# Patient Record
Sex: MALE | Race: WHITE | Employment: OTHER | URBAN - METROPOLITAN AREA
[De-identification: names, ages, dates, MRNs, and addresses within clinical notes are randomized per-mention and may not be internally consistent; named-entity substitution may affect disease eponyms.]

---

## 2017-03-09 ENCOUNTER — OFFICE VISIT (OUTPATIENT)
Dept: DERMATOLOGY CLINIC | Facility: CLINIC | Age: 67
End: 2017-03-09

## 2017-03-09 DIAGNOSIS — D22.9 MULTIPLE NEVI: ICD-10-CM

## 2017-03-09 DIAGNOSIS — L40.8 OTHER PSORIASIS: ICD-10-CM

## 2017-03-09 DIAGNOSIS — L57.0 AK (ACTINIC KERATOSIS): Primary | ICD-10-CM

## 2017-03-09 DIAGNOSIS — L82.0 INFLAMED SEBORRHEIC KERATOSIS: ICD-10-CM

## 2017-03-09 DIAGNOSIS — Z85.828 HISTORY OF SKIN CANCER: ICD-10-CM

## 2017-03-09 DIAGNOSIS — D23.9 BENIGN NEOPLASM OF SKIN, UNSPECIFIED LOCATION: ICD-10-CM

## 2017-03-09 PROCEDURE — 17000 DESTRUCT PREMALG LESION: CPT | Performed by: DERMATOLOGY

## 2017-03-09 PROCEDURE — 99213 OFFICE O/P EST LOW 20 MIN: CPT | Performed by: DERMATOLOGY

## 2017-03-27 NOTE — H&P
Catrina Bowens is a 61year old male. HPI:     CC:  Patient presents with:  Derm Problem: Established pt (LOV 4/2015) c/o lesions on face that has different texture. 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   Psoriasis: Hx of psoriasis to legs.  Managing with  Fluocinoni Problem: Established pt (LOV 4/2015) c/o lesions on face that has different texture. 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   Psoriasis: Hx of psoriasis to legs. Managing with  Fluocinonide (LIDEX) 0.05 % cream. Good results. Requesting refills.        Patient presents with c twice daily. Ak (actinic keratosis)  (primary encounter diagnosis)  History of skin cancer  Inflamed seborrheic keratosis  Other psoriasis  Multiple nevi  Benign neoplasm of skin, unspecified location    See details on map.       Remarkable for: melanoma discussed with patient. Sunscreen use, sun protection, self exams reviewed. Followup as noted RTC routine checkup 6 mos - one year or p.r.n.

## 2018-03-14 ENCOUNTER — OFFICE VISIT (OUTPATIENT)
Dept: DERMATOLOGY CLINIC | Facility: CLINIC | Age: 68
End: 2018-03-14

## 2018-03-14 DIAGNOSIS — L40.8 OTHER PSORIASIS: ICD-10-CM

## 2018-03-14 DIAGNOSIS — D48.5 NEOPLASM OF UNCERTAIN BEHAVIOR OF SKIN: Primary | ICD-10-CM

## 2018-03-14 DIAGNOSIS — Z85.828 HISTORY OF SKIN CANCER: ICD-10-CM

## 2018-03-14 DIAGNOSIS — D23.9 BENIGN NEOPLASM OF SKIN, UNSPECIFIED LOCATION: ICD-10-CM

## 2018-03-14 DIAGNOSIS — L57.0 AK (ACTINIC KERATOSIS): ICD-10-CM

## 2018-03-14 DIAGNOSIS — D22.9 MULTIPLE NEVI: ICD-10-CM

## 2018-03-14 DIAGNOSIS — L82.0 INFLAMED SEBORRHEIC KERATOSIS: ICD-10-CM

## 2018-03-14 PROCEDURE — 88305 TISSUE EXAM BY PATHOLOGIST: CPT | Performed by: DERMATOLOGY

## 2018-03-14 PROCEDURE — 11100 BIOPSY OF SKIN LESION: CPT | Performed by: DERMATOLOGY

## 2018-03-14 PROCEDURE — 99213 OFFICE O/P EST LOW 20 MIN: CPT | Performed by: DERMATOLOGY

## 2018-03-14 RX ORDER — AMLODIPINE BESYLATE 5 MG/1
5 TABLET ORAL DAILY
COMMUNITY

## 2018-03-15 NOTE — PROGRESS NOTES
Operative Report                     Shave Biopsy     Clinical diagnosis:    Size of lesion:    Location:    Diagnosis/Clinical History: bleeding painful lesion  Spec 1 Description >>>>>: right lateral jawline  Spec 1 Comment: irritated IDN vs other

## 2018-03-16 ENCOUNTER — TELEPHONE (OUTPATIENT)
Dept: DERMATOLOGY CLINIC | Facility: CLINIC | Age: 68
End: 2018-03-16

## 2018-03-16 NOTE — TELEPHONE ENCOUNTER
Pt called to find out if we can resend rx due to the [facility] stating they never received it. Eloisa Mckenzie

## 2018-03-17 NOTE — PROGRESS NOTES
The pathology report from last visit showed inflamed sk    . Please log in test results, send biopsy results letter. Pt to rtc 1 year or prn.

## 2018-03-17 NOTE — PROGRESS NOTES
Path as noted logged in book and [facility]

## 2018-03-26 NOTE — PROGRESS NOTES
Macky Dubin is a 74year old male. HPI:     CC:  Patient presents with:  Lesion: LOV 3-9-17.  c/o growth at R jawline, scabbed from shaving only. Also similar growth at L neck, no pain. Psoriasis: Annual visit for psosriasis.   Flaring more xander anesthetic No     Social History Narrative   None on file     No family history on file. There were no vitals filed for this visit. HPI:    Patient presents with:  Lesion: LOV 3-9-17.  c/o growth at R jawline, scabbed from shaving only.   Also similar for this Visit:    No prescriptions requested or ordered in this encounter         Neoplasm of uncertain behavior of skin  (primary encounter diagnosis)  Ak (actinic keratosis)  History of skin cancer  Inflamed seborrheic keratosis  Other psoriasis  Multip

## 2018-07-05 ENCOUNTER — OFFICE VISIT (OUTPATIENT)
Dept: DERMATOLOGY CLINIC | Facility: CLINIC | Age: 68
End: 2018-07-05

## 2018-07-05 DIAGNOSIS — L57.0 AK (ACTINIC KERATOSIS): Primary | ICD-10-CM

## 2018-07-05 DIAGNOSIS — L71.9 ROSACEA: ICD-10-CM

## 2018-07-05 DIAGNOSIS — D23.9 BENIGN NEOPLASM OF SKIN, UNSPECIFIED LOCATION: ICD-10-CM

## 2018-07-05 DIAGNOSIS — L82.0 INFLAMED SEBORRHEIC KERATOSIS: ICD-10-CM

## 2018-07-05 DIAGNOSIS — Z85.828 HISTORY OF SKIN CANCER: ICD-10-CM

## 2018-07-05 DIAGNOSIS — D22.9 MULTIPLE NEVI: ICD-10-CM

## 2018-07-05 DIAGNOSIS — L40.8 OTHER PSORIASIS: ICD-10-CM

## 2018-07-05 PROCEDURE — G0463 HOSPITAL OUTPT CLINIC VISIT: HCPCS | Performed by: DERMATOLOGY

## 2018-07-05 PROCEDURE — 99213 OFFICE O/P EST LOW 20 MIN: CPT | Performed by: DERMATOLOGY

## 2018-07-05 RX ORDER — KETOCONAZOLE 20 MG/G
CREAM TOPICAL
Qty: 60 G | Refills: 3 | Status: SHIPPED | OUTPATIENT
Start: 2018-07-05 | End: 2020-10-07 | Stop reason: ALTCHOICE

## 2018-07-16 NOTE — PROGRESS NOTES
Adi Rubi is a 79year old male. HPI:     CC:  Patient presents with:  Upper Body Exam: LOV 3/2018. Pt requesting upper body exam. Concerned with multiple lesions on back . Pt thought it was psoriasis at first. Hx of SCC and Aks.          Allerg anesthetic No     Social History Narrative   None on file     No family history on file. There were no vitals filed for this visit. HPI:    Patient presents with:  Upper Body Exam: LOV 3/2018.  Pt requesting upper body exam. Concerned with multiple le to affected area 2 times daily.              Ak (actinic keratosis)  (primary encounter diagnosis)  History of skin cancer  Rosacea  Other psoriasis  Inflamed seborrheic keratosis  Multiple nevi  Benign neoplasm of skin, unspecified location      Actinic ke grid.  Instructions reviewed at length. Benign nevi, seborrheic  keratoses, cherry angiomas:  Reassurance regarding other benign skin lesions. Signs and symptoms of skin cancer, ABCDE's of melanoma discussed with patient.  Sunscreen use, sun protection, s

## 2019-05-09 ENCOUNTER — OFFICE VISIT (OUTPATIENT)
Dept: DERMATOLOGY CLINIC | Facility: CLINIC | Age: 69
End: 2019-05-09
Payer: MEDICARE

## 2019-05-09 DIAGNOSIS — D23.5 BENIGN NEOPLASM OF SKIN OF TRUNK, EXCEPT SCROTUM: ICD-10-CM

## 2019-05-09 DIAGNOSIS — D23.30 BENIGN NEOPLASM OF SKIN OF FACE: ICD-10-CM

## 2019-05-09 DIAGNOSIS — D23.4 BENIGN NEOPLASM OF SCALP AND SKIN OF NECK: ICD-10-CM

## 2019-05-09 DIAGNOSIS — D22.9 MULTIPLE NEVI: ICD-10-CM

## 2019-05-09 DIAGNOSIS — L71.9 ROSACEA: ICD-10-CM

## 2019-05-09 DIAGNOSIS — L57.0 AK (ACTINIC KERATOSIS): Primary | ICD-10-CM

## 2019-05-09 DIAGNOSIS — L02.92 BOIL: ICD-10-CM

## 2019-05-09 DIAGNOSIS — D23.60 BENIGN NEOPLASM OF SKIN OF UPPER LIMB, INCLUDING SHOULDER, UNSPECIFIED LATERALITY: ICD-10-CM

## 2019-05-09 DIAGNOSIS — L40.8 OTHER PSORIASIS: ICD-10-CM

## 2019-05-09 DIAGNOSIS — D23.70 BENIGN NEOPLASM OF SKIN OF LOWER LIMB, INCLUDING HIP, UNSPECIFIED LATERALITY: ICD-10-CM

## 2019-05-09 DIAGNOSIS — L82.0 INFLAMED SEBORRHEIC KERATOSIS: ICD-10-CM

## 2019-05-09 DIAGNOSIS — Z85.828 HISTORY OF SKIN CANCER: ICD-10-CM

## 2019-05-09 PROCEDURE — G0463 HOSPITAL OUTPT CLINIC VISIT: HCPCS | Performed by: DERMATOLOGY

## 2019-05-09 PROCEDURE — 99214 OFFICE O/P EST MOD 30 MIN: CPT | Performed by: DERMATOLOGY

## 2019-05-09 RX ORDER — SILODOSIN 4 MG/1
CAPSULE ORAL
Refills: 0 | COMMUNITY
Start: 2019-01-02 | End: 2020-10-07 | Stop reason: ALTCHOICE

## 2019-05-09 RX ORDER — AMOXICILLIN AND CLAVULANATE POTASSIUM 875; 125 MG/1; MG/1
TABLET, FILM COATED ORAL
Refills: 0 | COMMUNITY
Start: 2018-11-21 | End: 2020-10-07 | Stop reason: ALTCHOICE

## 2019-05-09 RX ORDER — CLOBETASOL PROPIONATE 0.46 MG/ML
1 SOLUTION TOPICAL 2 TIMES DAILY
Qty: 50 ML | Refills: 6 | Status: SHIPPED | OUTPATIENT
Start: 2019-05-09 | End: 2019-05-24

## 2019-05-09 RX ORDER — METRONIDAZOLE 7.5 MG/G
GEL TOPICAL
Qty: 60 G | Refills: 11 | Status: SHIPPED | OUTPATIENT
Start: 2019-05-09 | End: 2019-05-24

## 2019-05-09 RX ORDER — METHYLPREDNISOLONE 4 MG/1
TABLET ORAL
Refills: 0 | COMMUNITY
Start: 2018-11-30 | End: 2020-10-07 | Stop reason: ALTCHOICE

## 2019-05-09 RX ORDER — GABAPENTIN 300 MG/1
300 CAPSULE ORAL 3 TIMES DAILY
COMMUNITY
Start: 2019-04-21

## 2019-05-19 NOTE — PROGRESS NOTES
Christal Parra is a 81year old male. HPI:     CC:  Patient presents with:  Derm Problem: LOV 7/5/18. pt presenting today with upper body check. pt concened about itchy scalp. pt aslo states needs refill on Fluorouracil.  pt has personal HX of SCC an by mouth. Disp:  Rfl:      Allergies:     Sulfa Antibiotics           Comment:Other reaction(s): Rash    Past Medical History:   Diagnosis Date   • Psoriasis    • Squamous cell skin cancer 2013    L wrist     History reviewed.  No pertinent surgical history on file    History reviewed. No pertinent family history. There were no vitals filed for this visit. HPI:    Patient presents with:  Derm Problem: LOV 7/5/18. pt presenting today with upper body check.  pt concened about itchy scalp. pt aslo states ne papules scattered, cherry-red vascular papules scattered. See map today's date for lesions noted . Scattered psoriasiform patches over The arms legs back. .    Erythematous scaling keratotic papulesClustered at right cheek nasal dorsum bilateral temples No new skin cancer. Psoriasis. Flaring more generalized scalp face elbows knees arms hands. Nails worsening with onycholysis, pits. Some breakage.   May use the ketoconazole in these areas along with the topicals discussed other options as far as h patient is asked to return as noted in follow-up/ above. This note was generated using Dragon voice recognition software. Please contact me regarding any confusion resulting from errors in recognition.

## 2019-05-24 ENCOUNTER — TELEPHONE (OUTPATIENT)
Dept: DERMATOLOGY CLINIC | Facility: CLINIC | Age: 69
End: 2019-05-24

## 2019-05-24 RX ORDER — CLOBETASOL PROPIONATE 0.46 MG/ML
1 SOLUTION TOPICAL 2 TIMES DAILY
Qty: 50 ML | Refills: 6 | Status: SHIPPED | OUTPATIENT
Start: 2019-05-24 | End: 2020-10-07 | Stop reason: ALTCHOICE

## 2019-05-24 RX ORDER — METRONIDAZOLE 7.5 MG/G
GEL TOPICAL
Qty: 60 G | Refills: 11 | Status: SHIPPED | OUTPATIENT
Start: 2019-05-24 | End: 2020-10-07 | Stop reason: ALTCHOICE

## 2019-05-24 NOTE — TELEPHONE ENCOUNTER
Current Outpatient Medications:     •  Clobetasol Propionate 0.05 % External Solution, Apply 1 mL topically 2 (two) times daily. , Disp: 50 mL, Rfl: 6    •  metroNIDAZOLE 0.75 % External Gel, Use bid to face, Disp: 60 g, Rfl: 11  •  Fluocinonide 0.05 % Ex

## 2019-06-04 ENCOUNTER — TELEPHONE (OUTPATIENT)
Dept: DERMATOLOGY CLINIC | Facility: CLINIC | Age: 69
End: 2019-06-04

## 2019-06-04 NOTE — TELEPHONE ENCOUNTER
LINDA-LOV 5/9/19. Patient very concerned with changing lesion to neck x couple days. Lesion is changing colors and scabbing.  Booked appt for 5/9/19 at 415pm

## 2019-06-04 NOTE — TELEPHONE ENCOUNTER
Pt asking to be seen asap. Has a mole that does not look good. Was seen on 5/9/19 for upper body check .

## 2019-06-10 ENCOUNTER — TELEPHONE (OUTPATIENT)
Dept: FAMILY MEDICINE CLINIC | Facility: CLINIC | Age: 69
End: 2019-06-10

## 2019-06-10 NOTE — TELEPHONE ENCOUNTER
Received 90 day supply request....     FLUOCINONIDE CREAM 60GM 0.05%  QTY 90  REFILLS 4    Placed in Physician mailbox

## 2019-06-12 ENCOUNTER — OFFICE VISIT (OUTPATIENT)
Dept: DERMATOLOGY CLINIC | Facility: CLINIC | Age: 69
End: 2019-06-12
Payer: MEDICARE

## 2019-06-12 DIAGNOSIS — D48.5 NEOPLASM OF UNCERTAIN BEHAVIOR OF SKIN: Primary | ICD-10-CM

## 2019-06-12 PROCEDURE — 11102 TANGNTL BX SKIN SINGLE LES: CPT | Performed by: DERMATOLOGY

## 2019-06-12 PROCEDURE — 99212 OFFICE O/P EST SF 10 MIN: CPT | Performed by: DERMATOLOGY

## 2019-06-12 PROCEDURE — G0463 HOSPITAL OUTPT CLINIC VISIT: HCPCS | Performed by: DERMATOLOGY

## 2019-06-12 PROCEDURE — 88305 TISSUE EXAM BY PATHOLOGIST: CPT | Performed by: DERMATOLOGY

## 2019-06-18 NOTE — PROGRESS NOTES
The pathology report from last visit showed right lateral neck -Seborrheic keratosis, irritated. Please log in test results, send biopsy results letter. Pt to rtc 4-6mos or prn.

## 2019-06-24 NOTE — PROGRESS NOTES
Merlene Puri is a 81year old male. HPI:     CC:  Patient presents with:  Moles: LOV 5/9/2019 Patient present with raised scaley red mole on R side of neck . Patient c/o mole changinge size .  Patient has hx of SCC AK        Allergies:  Sulfa Antib Sulfa Antibiotics           Comment:Other reaction(s): Rash    Past Medical History:   Diagnosis Date   • Psoriasis    • Squamous cell skin cancer 2013    L wrist     History reviewed. No pertinent surgical history.   Social History    Socioeconomic His pertinent family history. There were no vitals filed for this visit. HPI:    Patient presents with:  Moles: LOV 5/9/2019 Patient present with raised scaley red mole on R side of neck . Patient c/o mole changinge size .  Patient has hx of SCC AK  Past over The arms legs back. .    Erythematous scaling keratotic papules right cheek nasal dorsum temples improved crusted patient using Efudex  No recurrence of ScC left-hand    Otherwise remarkable for lesions as noted on map.     Assessment / plan:    Orders Overall skincare, liberal use of emollients discussed. Consider more aggressive therapy if worsening. Patient will let us know how they are doing over the next several weeks. Await clinical response to above therapy.   Recheck in 2-3 months if no improveme

## 2019-06-24 NOTE — PROGRESS NOTES
Operative Report                     Shave/  Tangential biopsy     Clinical diagnosis:    Size of lesion:    Location:Diagnosis/Clinical History: pt with red irritated papule  Spec 1 Description >>>>>: right lateral neck  Spec 1 Comment: irritated sk

## 2019-10-26 ENCOUNTER — OFFICE VISIT (OUTPATIENT)
Dept: DERMATOLOGY CLINIC | Facility: CLINIC | Age: 69
End: 2019-10-26
Payer: MEDICARE

## 2019-10-26 DIAGNOSIS — Z85.828 HISTORY OF SKIN CANCER: ICD-10-CM

## 2019-10-26 DIAGNOSIS — L71.9 ROSACEA: ICD-10-CM

## 2019-10-26 DIAGNOSIS — S90.221S: ICD-10-CM

## 2019-10-26 DIAGNOSIS — L57.0 AK (ACTINIC KERATOSIS): Primary | ICD-10-CM

## 2019-10-26 PROCEDURE — 99213 OFFICE O/P EST LOW 20 MIN: CPT | Performed by: DERMATOLOGY

## 2019-10-26 PROCEDURE — 17000 DESTRUCT PREMALG LESION: CPT | Performed by: DERMATOLOGY

## 2019-10-26 PROCEDURE — G0463 HOSPITAL OUTPT CLINIC VISIT: HCPCS | Performed by: DERMATOLOGY

## 2019-10-26 RX ORDER — FLUOROURACIL 50 MG/G
CREAM TOPICAL
Qty: 40 G | Refills: 1 | Status: SHIPPED | OUTPATIENT
Start: 2019-10-26

## 2019-11-04 NOTE — PROGRESS NOTES
Beata Parker is a 81year old male. HPI:     CC:  Patient presents with:  Lesion: established pt, presents with new, red, scaly lesion to left temple. Pt does use fluoraracil QD-Bid prn   Toenail: please check red big toenail.  \"It has a red lesio THEREAFTER. DISCONTINUE WITH GI UPSET., Disp: , Rfl: 0  RAPAFLO 4 MG Oral Cap, TK ONE C PO D, Disp: , Rfl: 0  ketoconazole 2 % External Cream, Apply to affected area 2 times daily. , Disp: 60 g, Rfl: 3  Benazepril HCl (LOTENSIN) 20 MG Oral Tab, , Disp: , Rf Other Topics      Concerns:        Grew up on a farm: Not Asked        History of tanning: Not Asked        Outdoor occupation: Not Asked        Pt has a pacemaker: No        Pt has a defibrillator: No        Reaction to local anesthetic: No    Social Hist well-nourished patient alert oriented in no acute distress.   Exam total-body performed, including scalp, head, neck, face,nails, hair, external eyes, including conjunctival mucosa, eyelids, lips external ears, back, chest,/ breasts, axillae,  abdomen, arms lesions. Fluorouracil as needed 2 weeks on 2 weeks off to active lesions temples nose infraorbital area twice weekly 6-week course again to forehead temples. Continue topicals sun protection. On the gammaglobulin will need more careful follow-up.

## 2020-09-28 ENCOUNTER — OFFICE VISIT (OUTPATIENT)
Dept: DERMATOLOGY CLINIC | Facility: CLINIC | Age: 70
End: 2020-09-28
Payer: MEDICARE

## 2020-09-28 DIAGNOSIS — D48.5 NEOPLASM OF UNCERTAIN BEHAVIOR OF SKIN: Primary | ICD-10-CM

## 2020-09-28 DIAGNOSIS — D22.9 MULTIPLE NEVI: ICD-10-CM

## 2020-09-28 DIAGNOSIS — L40.8 OTHER PSORIASIS: ICD-10-CM

## 2020-09-28 DIAGNOSIS — Z85.828 HISTORY OF SKIN CANCER: ICD-10-CM

## 2020-09-28 DIAGNOSIS — D23.4 BENIGN NEOPLASM OF SCALP AND SKIN OF NECK: ICD-10-CM

## 2020-09-28 DIAGNOSIS — D23.30 BENIGN NEOPLASM OF SKIN OF FACE: ICD-10-CM

## 2020-09-28 DIAGNOSIS — L57.0 AK (ACTINIC KERATOSIS): ICD-10-CM

## 2020-09-28 PROCEDURE — 88305 TISSUE EXAM BY PATHOLOGIST: CPT | Performed by: DERMATOLOGY

## 2020-09-28 PROCEDURE — G0463 HOSPITAL OUTPT CLINIC VISIT: HCPCS | Performed by: DERMATOLOGY

## 2020-09-28 PROCEDURE — 11102 TANGNTL BX SKIN SINGLE LES: CPT | Performed by: DERMATOLOGY

## 2020-09-28 PROCEDURE — 99213 OFFICE O/P EST LOW 20 MIN: CPT | Performed by: DERMATOLOGY

## 2020-09-28 RX ORDER — LORATADINE 10 MG/1
10 TABLET ORAL DAILY
COMMUNITY
End: 2020-10-07 | Stop reason: ALTCHOICE

## 2020-09-28 RX ORDER — KETOCONAZOLE 20 MG/G
1 CREAM TOPICAL 2 TIMES DAILY
Qty: 30 G | Refills: 3 | Status: SHIPPED | OUTPATIENT
Start: 2020-09-28 | End: 2020-10-07 | Stop reason: ALTCHOICE

## 2020-10-01 ENCOUNTER — TELEPHONE (OUTPATIENT)
Dept: DERMATOLOGY CLINIC | Facility: CLINIC | Age: 70
End: 2020-10-01

## 2020-10-01 NOTE — TELEPHONE ENCOUNTER
Dr. Brent Packer called and stated pt's pathology resulted in Melanoma. Results have been released to [facility].

## 2020-10-02 NOTE — PROGRESS NOTES
The pathology report from last visit showed focally invasive melanoma, 0.3mm, so should need excision, not likely sentinel node. Deep margin clear. More superficial changes at lateral margin ( MIS) . M/L ptcb  About biopsy.   Would suggest Dr. Lon Rudolph

## 2020-10-03 NOTE — PROGRESS NOTES
Logged in test results book and pmh. [address] [facility] left message for pt on 10/2/2020. Waiting for pt cb.

## 2020-10-03 NOTE — PROGRESS NOTES
Pt informed of pathology results and KMT's recommendations for treatment. Detailed explanation of procedure provided to pt. Pt verbalized understanding.   Contact information for recommended doctors from [facility] provided to pt via [employer] message (per pt's re

## 2020-10-05 NOTE — PROGRESS NOTES
Operative Report                     Shave/  Tangential biopsy     Clinical diagnosis:    Size of lesion:    Location:Diagnosis/Clinical History: pt with hx skin cancer and ak's changing pigmented lesion  Spec 1 Description >>>>>: left lateral elbow

## 2020-10-05 NOTE — PROGRESS NOTES
Garett Salinasr is a 78year old male. HPI:     CC:  Patient presents with:  Lesion: LOV 10/26/19. pt presenting today with lesion to L arm for 2 years. Lesion has changed in size. Denies pain or itching.  pt has HX of AK's        Allergies:  Sulfa An 875-125 MG Oral Tab   0   • methylPREDNISolone 4 MG Oral Tablet Therapy Pack TK UTD  0   • Oxaprozin 600 MG Oral Tab TK 3 TS PO PC TODAY FOLLOWED BY 2 TS D THEREAFTER.  DISCONTINUE WITH GI UPSET.  0   • Fluocinonide 0.05 % External Cream Apply topically to of club or organization: Not on file        Attends meetings of clubs or organizations: Not on file        Relationship status: Not on file      Intimate partner violence        Fear of current or ex partner: Not on file        Emotionally abused: Not on f comprehensive 10 point review of systems was completed. Pertinent positives and negatives noted in the the HPI. History, medications, allergies reviewed as noted.        Physical Examination:     Well-developed well-nourished patient alert oriented i 0.6cmx 1.4 cm tan pink and darker brown patch  Shave/ tangential biopsy performed, operative note and consent in chart further plans pending pathology    Overall AK's fairly stable. Continue careful sun protection.   Consider repeat Efudex forehead nose ea Sunscreen use, sun protection, self exams reviewed. Followup as noted RTC routine checkup 6 mos - one year or p.r.n. RTC 4-6 months  The patient indicates understanding of these issues and agrees to the plan.   The patient is asked to return as noted in

## 2020-10-06 ENCOUNTER — TELEPHONE (OUTPATIENT)
Dept: SURGERY | Facility: CLINIC | Age: 70
End: 2020-10-06

## 2020-10-07 ENCOUNTER — OFFICE VISIT (OUTPATIENT)
Dept: SURGERY | Facility: CLINIC | Age: 70
End: 2020-10-07
Payer: MEDICARE

## 2020-10-07 VITALS
OXYGEN SATURATION: 97 % | BODY MASS INDEX: 29.59 KG/M2 | DIASTOLIC BLOOD PRESSURE: 78 MMHG | HEIGHT: 69 IN | SYSTOLIC BLOOD PRESSURE: 126 MMHG | WEIGHT: 199.81 LBS | HEART RATE: 59 BPM | RESPIRATION RATE: 16 BRPM

## 2020-10-07 DIAGNOSIS — C43.62 MALIGNANT MELANOMA OF LEFT UPPER EXTREMITY INCLUDING SHOULDER (HCC): Primary | ICD-10-CM

## 2020-10-07 PROCEDURE — 99205 OFFICE O/P NEW HI 60 MIN: CPT | Performed by: SURGERY

## 2020-10-07 NOTE — PATIENT INSTRUCTIONS
Surgery: wide local excision    Date of Surgery:  10/26/2020    Hosptial:    [address] [facility]   Phone: [phone]    · This is an inpatient procedure.   · Use the provided Chlorhexadine surgical soap(instruct

## 2020-10-12 NOTE — CONSULTS
[facility]    Patient Name:  Talia Guerrero.    YOB: 1950   Gender:  Male   Appt Date:  10/7/2020   Provider:  Lazaro Cherry MD   Insurance:  MEDICARE PART B ONLY     PATIENT PROVIDERS  Referrin •  Cholecalciferol 25 MCG (1000 UT) Oral Tab, Take 2,000 Units by mouth daily. , Disp: , Rfl:   •  triamcinolone acetonide 0.1 % External Cream, USE TWICE A DAY AS DIRECTED, Disp: 454 g, Rfl: 1  •  fluorouracil 5 % External Cream, Use twice weekly at night Endocrine: Negative for polydipsia and polyuria. Genitourinary: Negative for dysuria and difficulty urinating. Musculoskeletal: Negative for myalgias. Skin: Negative for color change and pallor.    Allergic/Immunologic: Negative for immunocompromised Electronically signed by Leroy Thomas MD on 10/1/2020 at  8:03 AM      Final Diagnosis Comment     Sections show an atypical junctional melanocytic proliferation formed of melanocytes in nests with irregular size and distribution as well as areas of singl Clinical Information     S45.3 Neoplasm Of Uncertain Behavior Of Skin.      Gross Description     Labeled with the patient's name and medical record number, left lateral elbow, received in formalin: the specimen consists of a 1.0 x 0.8 x 0.1 cm light tan sh

## 2020-10-12 NOTE — H&P (VIEW-ONLY)
[facility]    Patient Name:  Lane Kaye.    YOB: 1950   Gender:  Male   Appt Date:  10/7/2020   Provider:  Esther Cali MD   Insurance:  MEDICARE PART B ONLY     PATIENT PROVIDERS  Referrin •  Cholecalciferol 25 MCG (1000 UT) Oral Tab, Take 2,000 Units by mouth daily. , Disp: , Rfl:   •  triamcinolone acetonide 0.1 % External Cream, USE TWICE A DAY AS DIRECTED, Disp: 454 g, Rfl: 1  •  fluorouracil 5 % External Cream, Use twice weekly at night Endocrine: Negative for polydipsia and polyuria. Genitourinary: Negative for dysuria and difficulty urinating. Musculoskeletal: Negative for myalgias. Skin: Negative for color change and pallor.    Allergic/Immunologic: Negative for immunocompromised Electronically signed by Todd Wolf MD on 10/1/2020 at  8:03 AM      Final Diagnosis Comment     Sections show an atypical junctional melanocytic proliferation formed of melanocytes in nests with irregular size and distribution as well as areas of singl Clinical Information     R89.0 Neoplasm Of Uncertain Behavior Of Skin.      Gross Description     Labeled with the patient's name and medical record number, left lateral elbow, received in formalin: the specimen consists of a 1.0 x 0.8 x 0.1 cm light tan sh

## 2020-10-23 ENCOUNTER — APPOINTMENT (OUTPATIENT)
Dept: LAB | Facility: HOSPITAL | Age: 70
End: 2020-10-23
Attending: SURGERY
Payer: MEDICARE

## 2020-10-23 DIAGNOSIS — Z01.818 PREOP TESTING: ICD-10-CM

## 2020-10-26 ENCOUNTER — HOSPITAL ENCOUNTER (OUTPATIENT)
Facility: HOSPITAL | Age: 70
Setting detail: HOSPITAL OUTPATIENT SURGERY
Discharge: HOME | End: 2020-10-26
Attending: SURGERY | Admitting: SURGERY
Payer: MEDICARE

## 2020-10-26 VITALS
DIASTOLIC BLOOD PRESSURE: 77 MMHG | TEMPERATURE: 98 F | RESPIRATION RATE: 16 BRPM | OXYGEN SATURATION: 99 % | HEART RATE: 59 BPM | BODY MASS INDEX: 28.88 KG/M2 | SYSTOLIC BLOOD PRESSURE: 140 MMHG | HEIGHT: 69 IN | WEIGHT: 195 LBS

## 2020-10-26 DIAGNOSIS — Z01.818 PREOP TESTING: Primary | ICD-10-CM

## 2020-10-26 DIAGNOSIS — C43.62 MALIGNANT MELANOMA OF LEFT UPPER EXTREMITY INCLUDING SHOULDER (HCC): ICD-10-CM

## 2020-10-26 PROCEDURE — 88305 TISSUE EXAM BY PATHOLOGIST: CPT | Performed by: SURGERY

## 2020-10-26 PROCEDURE — 0HBEXZZ EXCISION OF LEFT LOWER ARM SKIN, EXTERNAL APPROACH: ICD-10-PCS | Performed by: SURGERY

## 2020-10-26 RX ORDER — TRAMADOL HYDROCHLORIDE 50 MG/1
TABLET ORAL EVERY 4 HOURS PRN
Qty: 15 TABLET | Refills: 0 | Status: SHIPPED | OUTPATIENT
Start: 2020-10-26 | End: 2020-11-04 | Stop reason: ALTCHOICE

## 2020-10-26 RX ORDER — LIDOCAINE HYDROCHLORIDE AND EPINEPHRINE 10; 10 MG/ML; UG/ML
INJECTION, SOLUTION INFILTRATION; PERINEURAL AS NEEDED
Status: DISCONTINUED | OUTPATIENT
Start: 2020-10-26 | End: 2020-10-26 | Stop reason: HOSPADM

## 2020-10-26 RX ORDER — BUPIVACAINE HYDROCHLORIDE 5 MG/ML
INJECTION, SOLUTION EPIDURAL; INTRACAUDAL AS NEEDED
Status: DISCONTINUED | OUTPATIENT
Start: 2020-10-26 | End: 2020-10-26 | Stop reason: HOSPADM

## 2020-10-26 NOTE — BRIEF OP NOTE
Pre-Operative Diagnosis: Malignant melanoma of left upper extremity including shoulder (HCC) [C43.62]     Post-Operative Diagnosis: Malignant melanoma of left upper extremity including shoulder ([address] [C43.62]      Procedure Performed:   Procedure(s):  wide

## 2020-10-26 NOTE — INTERVAL H&P NOTE
Patient seen and examined. No changes to the attached history and physical are noted. 78year old male presenting today for planned wide local excision.     Magalis Romero PA-C    Department of Surgical Oncology  [facility]

## 2020-10-28 NOTE — OPERATIVE REPORT
Date of operation 10/26/2020    Preoperative diagnosis:  1. History of malignant melanoma, pT1a, left arm    Operation performed:  1. Wide local excision of malignant left arm melanoma, dimensions 2.5 x 7.5 cm  2.   Complex closure of wide local excision [facility] Surgical Oncology  [place] [id number]  Pager [phone]  [email or web address]

## 2020-11-04 ENCOUNTER — OFFICE VISIT (OUTPATIENT)
Dept: SURGERY | Facility: CLINIC | Age: 70
End: 2020-11-04
Payer: MEDICARE

## 2020-11-04 VITALS
DIASTOLIC BLOOD PRESSURE: 80 MMHG | HEART RATE: 60 BPM | BODY MASS INDEX: 29 KG/M2 | OXYGEN SATURATION: 96 % | RESPIRATION RATE: 16 BRPM | SYSTOLIC BLOOD PRESSURE: 130 MMHG | WEIGHT: 196 LBS | TEMPERATURE: 99 F

## 2020-11-04 DIAGNOSIS — C43.62 MALIGNANT MELANOMA OF LEFT UPPER ARM (HCC): Primary | ICD-10-CM

## 2020-11-04 PROCEDURE — 99024 POSTOP FOLLOW-UP VISIT: CPT | Performed by: SURGERY

## 2020-11-04 NOTE — PROGRESS NOTES
[facility]    Patient Name:  Megan Rangel.    YOB: 1950   Gender:  Male   Appt Date:  11/4/2020   Provider:  Alize Ross MD   Insurance:  MEDICARE PART B ONLY     PATIENT PROVIDERS  Referrin Besylate 5 MG Oral Tab, Take 5 mg by mouth daily. , Disp: , Rfl:   •  aspirin 81 MG Oral Chew Tab, Chew  by mouth., Disp: , Rfl:      Allergies Reviewed:    Sulfa Antibiotics       RASH     History:  Reviewed:  Past Medical History:   Diagnosis Date   • Hig Soft. Bowel sounds are normal. There is no hepatosplenomegaly. There is no abdominal tenderness. Musculoskeletal: Normal range of motion. Lymphadenopathy:        Right cervical: No superficial cervical adenopathy present.        Left cervical: No superfic identified    Anatomic Elodia Newman) Level  II (melanoma present in but does not fill and expand papillary dermis)    Mitotic Rate  None identified    Microsatellite(s)  Not identified    Lymphovascular Invasion  Not identified    Neurotropism  Not identified

## 2020-11-17 RX ORDER — CLOBETASOL PROPIONATE 0.46 MG/ML
SOLUTION TOPICAL
Qty: 200 ML | Refills: 3 | Status: SHIPPED | OUTPATIENT
Start: 2020-11-17

## 2021-04-19 ENCOUNTER — OFFICE VISIT (OUTPATIENT)
Dept: DERMATOLOGY CLINIC | Facility: CLINIC | Age: 71
End: 2021-04-19
Payer: MEDICARE

## 2021-04-19 DIAGNOSIS — D23.5 BENIGN NEOPLASM OF SKIN OF TRUNK, EXCEPT SCROTUM: ICD-10-CM

## 2021-04-19 DIAGNOSIS — D22.9 MULTIPLE NEVI: ICD-10-CM

## 2021-04-19 DIAGNOSIS — L82.1 SEBORRHEIC KERATOSES: Primary | ICD-10-CM

## 2021-04-19 DIAGNOSIS — L40.8 OTHER PSORIASIS: ICD-10-CM

## 2021-04-19 DIAGNOSIS — D23.60 BENIGN NEOPLASM OF SKIN OF UPPER LIMB, INCLUDING SHOULDER, UNSPECIFIED LATERALITY: ICD-10-CM

## 2021-04-19 DIAGNOSIS — D23.70 BENIGN NEOPLASM OF SKIN OF LOWER LIMB, INCLUDING HIP, UNSPECIFIED LATERALITY: ICD-10-CM

## 2021-04-19 DIAGNOSIS — L57.0 AK (ACTINIC KERATOSIS): ICD-10-CM

## 2021-04-19 DIAGNOSIS — D23.30 BENIGN NEOPLASM OF SKIN OF FACE: ICD-10-CM

## 2021-04-19 DIAGNOSIS — Z85.828 HISTORY OF SKIN CANCER: ICD-10-CM

## 2021-04-19 DIAGNOSIS — D23.4 BENIGN NEOPLASM OF SCALP AND SKIN OF NECK: ICD-10-CM

## 2021-04-19 PROCEDURE — 99213 OFFICE O/P EST LOW 20 MIN: CPT | Performed by: DERMATOLOGY

## 2021-04-19 RX ORDER — KETOCONAZOLE 20 MG/G
CREAM TOPICAL
COMMUNITY
Start: 2020-09-28

## 2021-04-25 NOTE — PROGRESS NOTES
Ino Negrete is a 78year old male. HPI:     CC:  Patient presents with:  Upper Body Exam: LOV 9/28/20. pt presenting today with upper body skin check.  pt has HX of  AK's,SCC, Melanoma        Allergies:  Sulfa Antibiotics    HISTORY:    Past Medic cell skin cancer 2013    L wrist     Past Surgical History:   Procedure Laterality Date   • ADENOIDECTOMY     • BIOPSY      nerve biopsy of foot   • TONSILLECTOMY       Social History    Socioeconomic History      Marital status:       Spouse name: history. There were no vitals filed for this visit. HPI:    Patient presents with:  Upper Body Exam: LOV 9/28/20. pt presenting today with upper body skin check.  pt has HX of  AK's,SCC, Melanoma      Past notes/ records and appropriate/relevant lab r lesions noted . Scattered psoriasiform patches over The arms legs back. .    Erythematous scaling keratotic papules right cheek nasal dorsum temples improved crusted patient using Efudex  No recurrence of ScC left-hand    Otherwise remarkable for lesions a More keratotic papule over the nasal dorsum trial of cryo. Await response. Recheck 4 months    Please refer to map for specific lesions. See additional diagnoses. Pros cons of various therapies, risks benefits discussed. Pathophysiology discussed with p

## 2021-05-05 ENCOUNTER — OFFICE VISIT (OUTPATIENT)
Dept: SURGERY | Facility: CLINIC | Age: 71
End: 2021-05-05
Payer: MEDICARE

## 2021-05-05 VITALS
DIASTOLIC BLOOD PRESSURE: 78 MMHG | TEMPERATURE: 98 F | OXYGEN SATURATION: 96 % | SYSTOLIC BLOOD PRESSURE: 156 MMHG | RESPIRATION RATE: 18 BRPM | HEART RATE: 72 BPM

## 2021-05-05 DIAGNOSIS — C43.62 MALIGNANT MELANOMA OF ARM, LEFT (HCC): Primary | ICD-10-CM

## 2021-05-05 PROCEDURE — 99214 OFFICE O/P EST MOD 30 MIN: CPT | Performed by: SURGERY

## 2021-05-05 NOTE — PROGRESS NOTES
[facility]    Patient Name:  Geraldo Phoenix.    YOB: 1950   Gender:  Male   Appt Date:  5/5/2021   Provider:  Cheyenne Allen MD   Insurance:  [address] [place] [address] by mouth daily. , Disp: , Rfl:   •  triamcinolone acetonide 0.1 % External Cream, USE TWICE A DAY AS DIRECTED (Patient not taking: Reported on 4/19/2021 ), Disp: 454 g, Rfl: 1  •  fluorouracil 5 % External Cream, Use twice weekly at night as directed x 6 we Neurological: Negative for syncope and weakness. Hematological: Does not bruise/bleed easily. Physical Examination:  Physical Exam    Constitutional: He is oriented to person, place, and time. He appears well-developed and well-nourished.  No dist Lateral elbow         Histologic Type  Superficial spreading melanoma (low-cumulative sun damage (CSD) melanoma)    Maximum Tumor (Breslow) Thickness (Millimeters)  0.3 mm   Ulceration  Not identified    Anatomic (Jaskaran) Level  II (melanoma present in but in 3 months. Samaria Godwin PA-C    Department of Surgical Oncology  [address] [facility], [address] [facility] AND [facility]  [address] [place] [address], [address] [place] [address]  T: [phone]  F: [phone]  Pager: [phone]

## 2021-07-10 ENCOUNTER — OFFICE VISIT (OUTPATIENT)
Dept: DERMATOLOGY CLINIC | Facility: CLINIC | Age: 71
End: 2021-07-10
Payer: MEDICARE

## 2021-07-10 DIAGNOSIS — Z85.828 HISTORY OF SKIN CANCER: ICD-10-CM

## 2021-07-10 DIAGNOSIS — D23.9 BENIGN NEOPLASM OF SKIN, UNSPECIFIED LOCATION: ICD-10-CM

## 2021-07-10 DIAGNOSIS — D22.9 MULTIPLE NEVI: Primary | ICD-10-CM

## 2021-07-10 DIAGNOSIS — L82.1 SEBORRHEIC KERATOSES: ICD-10-CM

## 2021-07-10 DIAGNOSIS — L40.8 OTHER PSORIASIS: ICD-10-CM

## 2021-07-10 DIAGNOSIS — L57.0 AK (ACTINIC KERATOSIS): ICD-10-CM

## 2021-07-10 PROCEDURE — 99213 OFFICE O/P EST LOW 20 MIN: CPT | Performed by: DERMATOLOGY

## 2021-07-18 NOTE — PROGRESS NOTES
Garett Walsh is a 73year old male. HPI:     CC:  Patient presents with:  Lesion: LOV 4/19/21. Pt concerned with lesion on stomach that he has had for a long time that is changing. Hx of melanoma, SCC, and AKs.          Allergies:  Sulfa Antibiotic 2013    L wrist     Past Surgical History:   Procedure Laterality Date   • ADENOIDECTOMY     • BIOPSY      nerve biopsy of foot   • TONSILLECTOMY       Social History    Socioeconomic History      Marital status:       Spouse name: Not on file vitals filed for this visit. HPI:    Patient presents with:  Lesion: LOV 4/19/21. Pt concerned with lesion on stomach that he has had for a long time that is changing. Hx of melanoma, SCC, and AKs.        Past notes/ records and appropriate/relevant lab tannish keratotic papules scattered, cherry-red vascular papules scattered. See map today's date for lesions noted . Scattered psoriasiform patches over The arms legs back. .    Erythematous scaling keratotic papules right cheek nasal dorsum temples imp Metronidazole as needed. Rhinophyma. Discussed various therapies. More keratotic papule over the nasal dorsum trial of cryo. Await response. Recheck 4 months    Please refer to map for specific lesions. See additional diagnoses.   Pros cons of various

## 2021-07-28 ENCOUNTER — PATIENT MESSAGE (OUTPATIENT)
Dept: DERMATOLOGY CLINIC | Facility: CLINIC | Age: 71
End: 2021-07-28

## 2021-07-28 NOTE — TELEPHONE ENCOUNTER
From: Héctor Carlos Sr. To: Jose Velazquez MD  Sent: 2021 1:14 PM CDT  Subject: Prescription Question    Hi Dr. Geremias Dunn, I went to use my metronidazole and noticed it has .  I haven't used any for a few years but noticed my rosacia has worse

## 2021-07-29 RX ORDER — METRONIDAZOLE 7.5 MG/G
GEL TOPICAL
Qty: 60 G | Refills: 11 | Status: SHIPPED | OUTPATIENT
Start: 2021-07-29

## 2021-08-18 ENCOUNTER — TELEPHONE (OUTPATIENT)
Dept: SURGERY | Facility: CLINIC | Age: 71
End: 2021-08-18

## 2021-08-18 NOTE — TELEPHONE ENCOUNTER
Pt called and LVM requesting an earlier appt with Dr. Jesse Traore due to concerns about recent weight loss. Referred pt to call and make appt with PCP. Pt is also followed by Dr. Jayda Chowdhury for skin checks.

## 2021-11-10 ENCOUNTER — OFFICE VISIT (OUTPATIENT)
Dept: SURGERY | Facility: CLINIC | Age: 71
End: 2021-11-10
Payer: MEDICARE

## 2021-11-10 VITALS
SYSTOLIC BLOOD PRESSURE: 122 MMHG | HEIGHT: 69 IN | BODY MASS INDEX: 27.55 KG/M2 | RESPIRATION RATE: 16 BRPM | WEIGHT: 186 LBS | HEART RATE: 99 BPM | DIASTOLIC BLOOD PRESSURE: 74 MMHG | OXYGEN SATURATION: 99 % | TEMPERATURE: 97 F

## 2021-11-10 DIAGNOSIS — C43.62 MALIGNANT MELANOMA OF ARM, LEFT (HCC): Primary | ICD-10-CM

## 2021-11-10 PROCEDURE — 99214 OFFICE O/P EST MOD 30 MIN: CPT | Performed by: SURGERY

## 2021-11-11 NOTE — PROGRESS NOTES
[facility]    Patient Name:  Jil Dance.    YOB: 1950   Gender:  Male   Appt Date:  11/11/2021   Provider:  Tapan Reid MD   Insurance:  MEDICARE PART B ONLY     PATIENT PROVIDERS  Referri mouth daily. , Disp: , Rfl:   •  aspirin 81 MG Oral Chew Tab, Chew  by mouth., Disp: , Rfl:   •  ketoconazole 2 % External Cream, MERRY EXT AA BID (Patient not taking: No sig reported), Disp: , Rfl:   •  triamcinolone acetonide 0.1 % External Cream, USE TWICE back pain and gait problem. Skin: Negative for color change and hair loss. Allergic/Immunologic: Negative for immunocompromised state. Neurological: Negative for speech difficulty, weakness and numbness.    Psychiatric/Behavioral: Negative for confusi similar melanocytes are present in the superficial dermis, in a background of chronic inflammation.       The findings are consistent with invasive melanoma. The tumor involves the peripheral margin. A re-excision with appropriate margins is recommended. cm from the surgically resected margin. The margins are inked in blue. The specimen is submitted in toto in one cassette.   (ZQ/al)          Assessment / Plan:  73year old gentleman with [id number] stage IA malignant melanoma of the left elbow s/p wide loca

## 2022-02-09 ENCOUNTER — TELEPHONE (OUTPATIENT)
Dept: DERMATOLOGY CLINIC | Facility: CLINIC | Age: 72
End: 2022-02-09

## 2022-02-09 RX ORDER — METRONIDAZOLE 7.5 MG/G
GEL TOPICAL
Qty: 60 G | Refills: 11 | Status: CANCELLED | OUTPATIENT
Start: 2022-02-09

## 2022-02-09 NOTE — TELEPHONE ENCOUNTER
Ax from [employer]    Requesting :    90 day supply for METRONIDAZOLE TOPLICAL GEL 75CK    Placed fax in pa inbox    LOV 7/10/21

## 2022-02-21 ENCOUNTER — OFFICE VISIT (OUTPATIENT)
Dept: DERMATOLOGY CLINIC | Facility: CLINIC | Age: 72
End: 2022-02-21
Payer: MEDICARE

## 2022-02-21 DIAGNOSIS — L82.1 SEBORRHEIC KERATOSES: ICD-10-CM

## 2022-02-21 DIAGNOSIS — Z85.820 PERSONAL HISTORY OF MALIGNANT MELANOMA OF SKIN: ICD-10-CM

## 2022-02-21 DIAGNOSIS — D22.9 MULTIPLE NEVI: ICD-10-CM

## 2022-02-21 DIAGNOSIS — L57.0 AK (ACTINIC KERATOSIS): Primary | ICD-10-CM

## 2022-02-21 DIAGNOSIS — D23.9 BENIGN NEOPLASM OF SKIN, UNSPECIFIED LOCATION: ICD-10-CM

## 2022-02-21 DIAGNOSIS — Z85.828 HISTORY OF SKIN CANCER: ICD-10-CM

## 2022-02-21 PROCEDURE — 99213 OFFICE O/P EST LOW 20 MIN: CPT | Performed by: DERMATOLOGY

## 2022-02-21 PROCEDURE — 17000 DESTRUCT PREMALG LESION: CPT | Performed by: DERMATOLOGY

## 2022-02-24 RX ORDER — METRONIDAZOLE 7.5 MG/G
GEL TOPICAL
Qty: 180 G | Refills: 1 | Status: SHIPPED | OUTPATIENT
Start: 2022-02-24

## 2022-08-27 ENCOUNTER — OFFICE VISIT (OUTPATIENT)
Dept: DERMATOLOGY CLINIC | Facility: CLINIC | Age: 72
End: 2022-08-27
Payer: MEDICARE

## 2022-08-27 DIAGNOSIS — D23.9 BENIGN NEOPLASM OF SKIN, UNSPECIFIED LOCATION: ICD-10-CM

## 2022-08-27 DIAGNOSIS — L40.8 OTHER PSORIASIS: ICD-10-CM

## 2022-08-27 DIAGNOSIS — Z85.828 HISTORY OF SKIN CANCER: ICD-10-CM

## 2022-08-27 DIAGNOSIS — L82.1 SEBORRHEIC KERATOSES: ICD-10-CM

## 2022-08-27 DIAGNOSIS — Z85.820 PERSONAL HISTORY OF MALIGNANT MELANOMA OF SKIN: ICD-10-CM

## 2022-08-27 DIAGNOSIS — L57.0 AK (ACTINIC KERATOSIS): Primary | ICD-10-CM

## 2022-08-27 DIAGNOSIS — D22.9 MULTIPLE NEVI: ICD-10-CM

## 2023-01-30 DIAGNOSIS — J44.9 COPD (CHRONIC OBSTRUCTIVE PULMONARY DISEASE) (CMD): Primary | ICD-10-CM

## 2023-02-21 ENCOUNTER — APPOINTMENT (OUTPATIENT)
Dept: RESPIRATORY THERAPY | Age: 73
End: 2023-02-21
Attending: FAMILY MEDICINE

## 2023-03-01 ENCOUNTER — OFFICE VISIT (OUTPATIENT)
Dept: DERMATOLOGY CLINIC | Facility: CLINIC | Age: 73
End: 2023-03-01

## 2023-03-01 DIAGNOSIS — L40.8 OTHER PSORIASIS: Primary | ICD-10-CM

## 2023-03-01 DIAGNOSIS — D22.9 MULTIPLE NEVI: ICD-10-CM

## 2023-03-01 DIAGNOSIS — Z85.828 HISTORY OF SKIN CANCER: ICD-10-CM

## 2023-03-01 DIAGNOSIS — Z85.820 PERSONAL HISTORY OF MALIGNANT MELANOMA OF SKIN: ICD-10-CM

## 2023-03-01 DIAGNOSIS — L82.1 SEBORRHEIC KERATOSES: ICD-10-CM

## 2023-03-01 DIAGNOSIS — L57.0 AK (ACTINIC KERATOSIS): ICD-10-CM

## 2023-03-01 DIAGNOSIS — D23.9 BENIGN NEOPLASM OF SKIN, UNSPECIFIED LOCATION: ICD-10-CM

## 2023-03-01 PROCEDURE — 17003 DESTRUCT PREMALG LES 2-14: CPT | Performed by: DERMATOLOGY

## 2023-03-01 PROCEDURE — 99213 OFFICE O/P EST LOW 20 MIN: CPT | Performed by: DERMATOLOGY

## 2023-03-01 PROCEDURE — 17000 DESTRUCT PREMALG LESION: CPT | Performed by: DERMATOLOGY

## 2023-03-01 RX ORDER — TRIAMCINOLONE ACETONIDE 1 MG/G
CREAM TOPICAL
Qty: 454 G | Refills: 1 | Status: SHIPPED | OUTPATIENT
Start: 2023-03-01

## 2023-03-02 ENCOUNTER — HOSPITAL ENCOUNTER (OUTPATIENT)
Dept: RESPIRATORY THERAPY | Age: 73
Discharge: HOME | End: 2023-03-02
Attending: FAMILY MEDICINE

## 2023-03-02 DIAGNOSIS — J44.9 COPD (CHRONIC OBSTRUCTIVE PULMONARY DISEASE) (CMD): ICD-10-CM

## 2023-03-02 PROCEDURE — 94726 PLETHYSMOGRAPHY LUNG VOLUMES: CPT

## 2023-03-02 PROCEDURE — 94060 EVALUATION OF WHEEZING: CPT

## 2023-03-02 PROCEDURE — 94729 DIFFUSING CAPACITY: CPT

## 2023-03-02 PROCEDURE — 10002801 HB RX 250 W/O HCPCS: Performed by: INTERNAL MEDICINE

## 2023-03-02 RX ORDER — ALBUTEROL SULFATE 2.5 MG/3ML
2.5 SOLUTION RESPIRATORY (INHALATION) ONCE
Status: COMPLETED | OUTPATIENT
Start: 2023-03-02 | End: 2023-03-02

## 2023-03-02 RX ADMIN — ALBUTEROL SULFATE 2.5 MG: 2.5 SOLUTION RESPIRATORY (INHALATION) at 15:13

## 2023-08-21 ENCOUNTER — OFFICE VISIT (OUTPATIENT)
Dept: DERMATOLOGY CLINIC | Facility: CLINIC | Age: 73
End: 2023-08-21

## 2023-08-21 DIAGNOSIS — D22.9 MULTIPLE NEVI: ICD-10-CM

## 2023-08-21 DIAGNOSIS — Z85.828 HISTORY OF NONMELANOMA SKIN CANCER: ICD-10-CM

## 2023-08-21 DIAGNOSIS — Z85.820 PERSONAL HISTORY OF MALIGNANT MELANOMA OF SKIN: ICD-10-CM

## 2023-08-21 DIAGNOSIS — L82.1 SEBORRHEIC KERATOSES: ICD-10-CM

## 2023-08-21 DIAGNOSIS — D23.9 BENIGN NEOPLASM OF SKIN, UNSPECIFIED LOCATION: ICD-10-CM

## 2023-08-21 DIAGNOSIS — L57.0 AK (ACTINIC KERATOSIS): Primary | ICD-10-CM

## 2023-08-21 PROCEDURE — 17000 DESTRUCT PREMALG LESION: CPT | Performed by: DERMATOLOGY

## 2023-08-21 PROCEDURE — 99213 OFFICE O/P EST LOW 20 MIN: CPT | Performed by: DERMATOLOGY

## 2023-08-21 RX ORDER — TRIAMCINOLONE ACETONIDE 1 MG/G
CREAM TOPICAL
Qty: 454 G | Refills: 1 | Status: SHIPPED | OUTPATIENT
Start: 2023-08-21

## 2023-08-21 RX ORDER — VALSARTAN 40 MG/1
40 TABLET ORAL DAILY
COMMUNITY
Start: 2023-08-08

## 2023-08-21 RX ORDER — METOPROLOL SUCCINATE 25 MG/1
25 TABLET, EXTENDED RELEASE ORAL DAILY
COMMUNITY
Start: 2023-08-08

## 2023-08-21 RX ORDER — ATORVASTATIN CALCIUM 40 MG/1
40 TABLET, FILM COATED ORAL DAILY
COMMUNITY
Start: 2023-08-08

## 2023-08-28 NOTE — PROGRESS NOTES
Estee Koehler is a 72year old male. HPI:     CC:  Patient presents with:  Upper Body Exam: Hx of melanoma, SCC, and AKS. LOV 3/2023. Pt presents for upper body exam.  Lesion of concern to the right collarbone area and left ear. Pt has noticed some growth to the area. Denies bleeding or pain. Rf triamcinolone 0.1 % External Cream  Does use fluorouracil 5 % External Cream PRN to forehead        Allergies:  Sulfa Antibiotics    HISTORY:    Past Medical History:   Diagnosis Date    High blood pressure     Invasive Melanoma of skin ([address] 2020    Left lateral elbow    Neuropathy     Psoriasis     Squamous cell skin cancer 2013    L wrist      Past Surgical History:   Procedure Laterality Date    ADENOIDECTOMY      BIOPSY      nerve biopsy of foot    TONSILLECTOMY        History reviewed. No pertinent family history. Social History     Socioeconomic History    Marital status:    Tobacco Use    Smoking status: Former     Packs/day: 0.00     Types: Cigarettes     Quit date: 1989     Years since quittin.9     Passive exposure: Never    Smokeless tobacco: Never   Substance and Sexual Activity    Alcohol use: Yes     Comment: Occ    Drug use: Not Currently   Other Topics Concern    Grew up on a farm No    History of tanning Yes    Outdoor occupation No    Pt has a pacemaker No    Pt has a defibrillator No    Reaction to local anesthetic No        Current Outpatient Medications   Medication Sig Dispense Refill    metoprolol succinate ER 25 MG Oral Tablet 24 Hr Take 1 tablet (25 mg total) by mouth daily. valsartan 40 MG Oral Tab Take 1 tablet (40 mg total) by mouth daily. atorvastatin 40 MG Oral Tab Take 1 tablet (40 mg total) by mouth daily.       triamcinolone 0.1 % External Cream USE TWICE A DAY AS DIRECTED 454 g 1    metroNIDAZOLE 0.75 % External Gel Use bid to face 180 g 1    ketoconazole 2 % External Cream MERRY EXT AA BID      CLOBETASOL PROPIONATE 0.05 % External Solution APPLY 1 ML TOPICALLY TWICE A  mL 3    Cholecalciferol 25 MCG (1000 UT) Oral Tab Take 2 tablets (2,000 Units total) by mouth daily. gabapentin 300 MG Oral Cap Take 1 capsule (300 mg total) by mouth 3 (three) times daily. AmLODIPine Besylate 5 MG Oral Tab Take 1 tablet (5 mg total) by mouth daily. aspirin 81 MG Oral Chew Tab Chew by mouth. fluorouracil 5 % External Cream Use twice weekly at night as directed x 6 weeks (Patient not taking: Reported on 2023) 40 g 1     Allergies:     Sulfa Antibiotics       RASH    Past Medical History:   Diagnosis Date    High blood pressure     Invasive Melanoma of skin ([facility] [address] 2020    Left lateral elbow    Neuropathy     Psoriasis     Squamous cell skin cancer     L wrist     Past Surgical History:   Procedure Laterality Date    ADENOIDECTOMY      BIOPSY      nerve biopsy of foot    TONSILLECTOMY       Social History    Socioeconomic History      Marital status:       Spouse name: Not on file      Number of children: Not on file      Years of education: Not on file      Highest education level: Not on file    Occupational History      Not on file    Tobacco Use      Smoking status: Former        Packs/day: 0.00        Types: Cigarettes        Quit date: 1989        Years since quittin.9        Passive exposure: Never      Smokeless tobacco: Never    Substance and Sexual Activity      Alcohol use: Yes        Comment:  Occ      Drug use: Not Currently      Sexual activity: Not on file    Other Topics      Concerns:        Grew up on a farm: No        History of tanning: Yes        Outdoor occupation: No        Pt has a pacemaker: No        Pt has a defibrillator: No        Reaction to local anesthetic: No    Social History Narrative      Not on file    Social Determinants of Health  Financial Resource Strain: Not on file  Food Insecurity: Not on file  Transportation Needs: Not on file  Physical Activity: Not on file  Stress: Not on file  Social Connections: Not on file  Housing Stability: Not on file  History reviewed. No pertinent family history. There were no vitals filed for this visit. HPI:    Patient presents with:  Upper Body Exam: Hx of melanoma, SCC, and AKS. LOV 3/2023. Pt presents for upper body exam.  Lesion of concern to the right collarbone area and left ear. Pt has noticed some growth to the area. Denies bleeding or pain. Rf triamcinolone 0.1 % External Cream  Does use fluorouracil 5 % External Cream PRN to forehead      Past notes/ records and appropriate/relevant lab results including pathology and past body maps reviewed. Updated and new information noted in current visit. History of skin cancers including BCC, melanoma left lateral elbow 2020 SCC left wrist 2013, multiple AK's rosacea stable  Psoriasis better controlled with topicals     neuropathy controlled on gabapentin no further IVIG. Post excision melanoma left arm no recurrence 0.3 mm    Patient presents with concerns above. Right cheek lesion had improved and then return following had used fluorouracil on this occasional flareups in psoriasis. Has  Fluocinonide-- controls psoriasis. Refill uses this sporadically. Overalls been doing pretty well    Patient has been in their usual state of health. History, medications, allergies reviewed as noted. ROS:  Denies any other systemic complaints. No new or changeing lesions other than noted above. No fevers, chills, night sweats, unusual sun sensitivity. No other skin complaints. A comprehensive 10 point review of systems was completed. Pertinent positives and negatives noted in the the HPI. History, medications, allergies reviewed as noted. Physical Examination:     Well-developed well-nourished patient alert oriented in no acute distress.   Exam total-body performed, including scalp, head, neck, face,nails, hair, external eyes, including conjunctival mucosa, eyelids, lips external ears, back, chest,/ breasts, axillae,  abdomen, arms, legs, palms. Multiple light to medium brown, well marginated, uniformly pigmented, macules and papules 6 mm and less scattered on exam. pigmented lesions examined with dermoscopy benign-appearing patterns. Waxy tannish keratotic papules scattered, cherry-red vascular papules scattered. See map today's date for lesions noted . Scattered psoriasiform patches over The arms legs back. .    Erythematous scaling keratotic papules right cheek nasal dorsum temples improved crusted patient using Efudex  No recurrence of ScC left-hand    Otherwise remarkable for lesions as noted on map. Assessment / plan:    No orders of the defined types were placed in this encounter. Meds & Refills for this Visit:  Requested Prescriptions     Signed Prescriptions Disp Refills    triamcinolone 0.1 % External Cream 454 g 1     Sig: USE TWICE A DAY AS DIRECTED         Ak (actinic keratosis)  (primary encounter diagnosis)  Seborrheic keratoses  Multiple nevi  Benign neoplasm of skin, unspecified location  Personal history of malignant melanoma of skin  History of nonmelanoma skin cancer    Lesion of concern at left upper abdomen 5 to 6 mm irregular brown macule see photos. Plan recheck in 3 to 4 months. Continue careful monitoring given history of melanoma, BCC AK's psoriasis. Erythematous scaling keratotic papules noted Actinic Keratoses. Precancerous nature discussed. Sun protection, sunscreen/ blocks encouraged Lesions treated with cryo- . Biopsy if not resolved. x left forehead temple2    Melanoma left elbow 9/2020 0.3 mm superficial spreading. Jaskaran level II stable doing well no adenopathy no recurrence    Overall AK's fairly stable. Continue careful sun protection. Consider repeat Efudex forehead nose ears. 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 At left postauricular neck tannish papule observe. Actinic keratoses. Precancerous nature discussed. Continue careful sun protection. Cystic papule left neck observe. Stable  Patient with history of skin cancer. No evidence of recurrence. Psoriasis. No arthritis. Continue topicals. Requests refill of ketoconazole for intertriginous lesions. Continue monitoring. Continue topicals. Seems better presently. Continue careful observation still plaques arms legs. Scalp improved      . Rosacea. Overall stable rhinophyma slow progression meds in grid. Skin care instructions reviewed. Pathophysiology reviewed. Chronic recurrent nature discussed. Patient will let us know how they are doing over the next several weeks. Await clinical response to above therapy. Metronidazole as needed. Rhinophyma. Discussed various therapies. More keratotic papule over the nasal dorsum trial of cryo. Await response. Recheck 4 months    Please refer to map for specific lesions. See additional diagnoses. Pros cons of various therapies, risks benefits discussed. Pathophysiology discussed with patient. Therapeutic options reviewed. See  Medications in grid. Instructions reviewed at length. Benign nevi, seborrheic  keratoses, cherry angiomas:  Reassurance regarding other benign skin lesions. Signs and symptoms of skin cancer, ABCDE's of melanoma discussed with patient. Sunscreen use, sun protection, self exams reviewed. Followup as noted RTC routine checkup 6 mos - one year or p.r.n. RTC 4-6 months  The patient indicates understanding of these issues and agrees to the plan. The patient is asked to return as noted in follow-up/ above. This note was generated using Dragon voice recognition software. Please contact me regarding any confusion resulting from errors in recognition.     Encounter Times  Including precharting, reviewing chart, prior notes obtaining history: 5 minutes, medical exam :10 minutes, notes on body map, plan, counseling 10minutes My total time spent caring for the patient on the day of the encounter: 25 minutes       Cinthya Pulido Sr. is a 72year old male. HPI:     CC:  Patient presents with:  Upper Body Exam: Hx of melanoma, SCC, and AKS. LOV 3/2023. Pt presents for upper body exam.  Lesion of concern to the right collarbone area and left ear. Pt has noticed some growth to the area. Denies bleeding or pain. Rf triamcinolone 0.1 % External Cream  Does use fluorouracil 5 % External Cream PRN to forehead        Allergies:  Sulfa Antibiotics    HISTORY:    Past Medical History:   Diagnosis Date    High blood pressure     Invasive Melanoma of skin ([address] 2020    Left lateral elbow    Neuropathy     Psoriasis     Squamous cell skin cancer 2013    L wrist      Past Surgical History:   Procedure Laterality Date    ADENOIDECTOMY      BIOPSY      nerve biopsy of foot    TONSILLECTOMY        History reviewed. No pertinent family history. Social History     Socioeconomic History    Marital status:    Tobacco Use    Smoking status: Former     Packs/day: 0.00     Types: Cigarettes     Quit date: 1989     Years since quittin.9     Passive exposure: Never    Smokeless tobacco: Never   Substance and Sexual Activity    Alcohol use: Yes     Comment: Occ    Drug use: Not Currently   Other Topics Concern    Grew up on a farm No    History of tanning Yes    Outdoor occupation No    Pt has a pacemaker No    Pt has a defibrillator No    Reaction to local anesthetic No        Current Outpatient Medications   Medication Sig Dispense Refill    metoprolol succinate ER 25 MG Oral Tablet 24 Hr Take 1 tablet (25 mg total) by mouth daily. valsartan 40 MG Oral Tab Take 1 tablet (40 mg total) by mouth daily. atorvastatin 40 MG Oral Tab Take 1 tablet (40 mg total) by mouth daily.       triamcinolone 0.1 % External Cream USE TWICE A DAY AS DIRECTED 454 g 1    metroNIDAZOLE 0.75 % External Gel Use bid to face 180 g 1    ketoconazole 2 % External Cream MERRY EXT AA BID      CLOBETASOL PROPIONATE 0.05 % External Solution APPLY 1 ML TOPICALLY TWICE A  mL 3    Cholecalciferol 25 MCG (1000 UT) Oral Tab Take 2 tablets (2,000 Units total) by mouth daily. gabapentin 300 MG Oral Cap Take 1 capsule (300 mg total) by mouth 3 (three) times daily. AmLODIPine Besylate 5 MG Oral Tab Take 1 tablet (5 mg total) by mouth daily. aspirin 81 MG Oral Chew Tab Chew by mouth. fluorouracil 5 % External Cream Use twice weekly at night as directed x 6 weeks (Patient not taking: Reported on 2023) 40 g 1     Allergies:     Sulfa Antibiotics       RASH    Past Medical History:   Diagnosis Date    High blood pressure     Invasive Melanoma of skin ([facility] [address] 2020    Left lateral elbow    Neuropathy     Psoriasis     Squamous cell skin cancer 2013    L wrist     Past Surgical History:   Procedure Laterality Date    ADENOIDECTOMY      BIOPSY      nerve biopsy of foot    TONSILLECTOMY       Social History    Socioeconomic History      Marital status:       Spouse name: Not on file      Number of children: Not on file      Years of education: Not on file      Highest education level: Not on file    Occupational History      Not on file    Tobacco Use      Smoking status: Former        Packs/day: 0.00        Types: Cigarettes        Quit date: 1989        Years since quittin.9        Passive exposure: Never      Smokeless tobacco: Never    Substance and Sexual Activity      Alcohol use: Yes        Comment:  Occ      Drug use: Not Currently      Sexual activity: Not on file    Other Topics      Concerns:        Grew up on a farm: No        History of tanning: Yes        Outdoor occupation: No        Pt has a pacemaker: No        Pt has a defibrillator: No        Reaction to local anesthetic: No    Social History Narrative      Not on file    Social Determinants of Health  Financial Resource Strain: Not on file  Food Insecurity: Not on file  Transportation Needs: Not on file  Physical Activity: Not on file  Stress: Not on file  Social Connections: Not on file  Housing Stability: Not on file  History reviewed. No pertinent family history. There were no vitals filed for this visit. HPI:    Patient presents with:  Upper Body Exam: Hx of melanoma, SCC, and AKS. LOV 3/2023. Pt presents for upper body exam.  Lesion of concern to the right collarbone area and left ear. Pt has noticed some growth to the area. Denies bleeding or pain. Rf triamcinolone 0.1 % External Cream  Does use fluorouracil 5 % External Cream PRN to forehead      Past notes/ records and appropriate/relevant lab results including pathology and past body maps reviewed. Updated and new information noted in current visit. Lesion of concern to the right collarbone area and left ear    Follow-up concerned lesion on leg left. Has come up fairly recently. Request refill of triamcinolone. 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 History of skin cancers including BCC, melanoma left lateral elbow 2020 SCC left wrist 2013, multiple AK's rosacea stable  Psoriasis better -controlled with topicals     neuropathy controlled on gabapentin no further IVIG. Post excision melanoma left arm no recurrence 0.3 mm    Patient presents with concerns above. Right cheek lesion had improved and then return following had used fluorouracil on this occasional flareups in psoriasis. Has  Fluocinonide-- controls psoriasis. Refill uses this sporadically. Overalls been doing pretty well    Patient has been in their usual state of health. History, medications, allergies reviewed as noted. ROS:  Denies any other systemic complaints. No new or changeing lesions other than noted above. No fevers, chills, night sweats, unusual sun sensitivity. No other skin complaints. A comprehensive 10 point review of systems was completed.   Pertinent positives and negatives noted in the the HPI.       History, medications, allergies reviewed as noted. Physical Examination:     Well-developed well-nourished patient alert oriented in no acute distress. Exam total-body performed, including scalp, head, neck, face,nails, hair, external eyes, including conjunctival mucosa, eyelids, lips external ears, back, chest,/ breasts, axillae,  abdomen, arms, legs, palms. Multiple light to medium brown, well marginated, uniformly pigmented, macules and papules 6 mm and less scattered on exam. pigmented lesions examined with dermoscopy benign-appearing patterns. Waxy tannish keratotic papules scattered, cherry-red vascular papules scattered. See map today's date for lesions noted . Scattered psoriasiform patches over The arms legs back. .    Erythematous scaling keratotic papules right cheek nasal dorsum temples improved crusted patient using Efudex  No recurrence of ScC left-hand    Otherwise remarkable for lesions as noted on map. Assessment / plan:    No orders of the defined types were placed in this encounter. Meds & Refills for this Visit:  Requested Prescriptions     Signed Prescriptions Disp Refills    triamcinolone 0.1 % External Cream 454 g 1     Sig: USE TWICE A DAY AS DIRECTED         Ak (actinic keratosis)  (primary encounter diagnosis)  Seborrheic keratoses  Multiple nevi  Benign neoplasm of skin, unspecified location  Personal history of malignant melanoma of skin  History of nonmelanoma skin cancer       Lesion of concern to the right collarbone area and left ear  Benign keratoses reassurance. Trial of cryo. Area of concern at left posterior calf psoriasiform patch. Nevus within this has been unchanged. Stable. Lesion of concern at left upper abdomen 5 to 6 mm irregular brown macule see photos. Stable plan recheck in 3 to 4 months. Continue careful monitoring given history of melanoma, BCC AK's psoriasis.   Lesion stable    Erythematous scaling keratotic papules noted at sites noted on map  [address] [place] [address] Blvd. Precancerous nature discussed. Sun protection, sunscreen/ blocks encouraged Lesions treated with cryo- . Biopsy if not resolved. Left temple consider repeat Efudex    Continue Efudex as needed to areas of scaling on face. Scaling area at upper central lip -this has improved -will observe carefully    Multiple medium brown macules 5 to 6 mm uniform and dermoscopy at posterior thighs, calf observed. 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 Jaskaran level II stable doing well no adenopathy no recurrence    At left postauricular neck tannish papule observe. Actinic keratoses. Precancerous nature discussed. Continue careful sun protection. Cystic papule left neck observe. Stable  Patient with history of skin cancer. No evidence of recurrence. Psoriasis. No arthritis. Continue topicals. Requests refill of ketoconazole for intertriginous lesions. Continue monitoring. Continue topicals. Seems better presently. Continue careful observation still plaques arms legs. Scalp improved  Overall stable continue triamcinolone as needed    . Rosacea. Overall stable rhinophyma slow progression meds in grid. Skin care instructions reviewed. Pathophysiology reviewed. Chronic recurrent nature discussed. Patient will let us know how they are doing over the next several weeks. Await clinical response to above therapy. Metronidazole as needed. Rhinophyma. Discussed various therapies. More keratotic papule over the nasal dorsum trial of cryo. Await response. Recheck 4 months    Please refer to map for specific lesions. See additional diagnoses. Pros cons of various therapies, risks benefits discussed. Pathophysiology discussed with patient. Therapeutic options reviewed. See  Medications in grid. Instructions reviewed at length.     Benign nevi, seborrheic  keratoses, cherry angiomas:  Reassurance regarding other benign skin lesions. Signs and symptoms of skin cancer, ABCDE's of melanoma discussed with patient. Sunscreen use, sun protection, self exams reviewed. Followup as noted RTC routine checkup 6 mos - one year or p.r.n. RTC 4-6 months  The patient indicates understanding of these issues and agrees to the plan. The patient is asked to return as noted in follow-up/ above. This note was generated using Dragon voice recognition software. Please contact me regarding any confusion resulting from errors in recognition.     Encounter Times  Including precharting, reviewing chart, prior notes obtaining history: 5 minutes, medical exam :10 minutes, notes on body map, plan, counseling 10minutes My total time spent caring for the patient on the day of the encounter: 25 minutes

## 2024-05-09 ENCOUNTER — OFFICE VISIT (OUTPATIENT)
Dept: DERMATOLOGY CLINIC | Facility: CLINIC | Age: 74
End: 2024-05-09

## 2024-05-09 DIAGNOSIS — D22.9 MULTIPLE BENIGN NEVI: ICD-10-CM

## 2024-05-09 DIAGNOSIS — L57.0 MULTIPLE ACTINIC KERATOSES: ICD-10-CM

## 2024-05-09 DIAGNOSIS — D18.01 CHERRY ANGIOMA: ICD-10-CM

## 2024-05-09 DIAGNOSIS — L82.1 SEBORRHEIC KERATOSES: Primary | ICD-10-CM

## 2024-05-09 DIAGNOSIS — D49.2 NEOPLASM OF UNSPECIFIED BEHAVIOR OF BONE, SOFT TISSUE, AND SKIN: ICD-10-CM

## 2024-05-09 DIAGNOSIS — L71.9 ROSACEA: ICD-10-CM

## 2024-05-09 DIAGNOSIS — L81.4 LENTIGINES: ICD-10-CM

## 2024-05-09 PROCEDURE — 17000 DESTRUCT PREMALG LESION: CPT | Performed by: STUDENT IN AN ORGANIZED HEALTH CARE EDUCATION/TRAINING PROGRAM

## 2024-05-09 PROCEDURE — 17003 DESTRUCT PREMALG LES 2-14: CPT | Performed by: STUDENT IN AN ORGANIZED HEALTH CARE EDUCATION/TRAINING PROGRAM

## 2024-05-09 PROCEDURE — 99214 OFFICE O/P EST MOD 30 MIN: CPT | Performed by: STUDENT IN AN ORGANIZED HEALTH CARE EDUCATION/TRAINING PROGRAM

## 2024-05-09 PROCEDURE — 1160F RVW MEDS BY RX/DR IN RCRD: CPT | Performed by: STUDENT IN AN ORGANIZED HEALTH CARE EDUCATION/TRAINING PROGRAM

## 2024-05-09 PROCEDURE — 1159F MED LIST DOCD IN RCRD: CPT | Performed by: STUDENT IN AN ORGANIZED HEALTH CARE EDUCATION/TRAINING PROGRAM

## 2024-05-09 NOTE — PROGRESS NOTES
May 9, 2024    New patient     CHIEF COMPLAINT: UBSE    HISTORY OF PRESENT ILLNESS: .    1. spots  Location: (3) spots left side of torso, (1) spot on back, chest and left side of nose near eye   Duration: Unsure  Signs and symptoms: NONE  DERM HISTORY:  History of skin cancer: Yes; melanoma in 2020 left elbow    FAMILY HISTORY:  History of melanoma: Yes    PAST MEDICAL HISTORY:  Past Medical History:    High blood pressure    Invasive Melanoma of skin (HCC)    Left lateral elbow    Neuropathy    Psoriasis    Squamous cell skin cancer    L wrist       REVIEW OF SYSTEMS:  Constitutional: Denies fever, chills, unintentional weight loss.   Skin as per HPI    Medications  Current Outpatient Medications   Medication Sig Dispense Refill    metoprolol succinate ER 25 MG Oral Tablet 24 Hr Take 1 tablet (25 mg total) by mouth daily.      valsartan 40 MG Oral Tab Take 1 tablet (40 mg total) by mouth daily.      atorvastatin 40 MG Oral Tab Take 1 tablet (40 mg total) by mouth daily.      triamcinolone 0.1 % External Cream USE TWICE A DAY AS DIRECTED 454 g 1    metroNIDAZOLE 0.75 % External Gel Use bid to face 180 g 1    ketoconazole 2 % External Cream MERRY EXT AA BID      CLOBETASOL PROPIONATE 0.05 % External Solution APPLY 1 ML TOPICALLY TWICE A  mL 3    Cholecalciferol 25 MCG (1000 UT) Oral Tab Take 2 tablets (2,000 Units total) by mouth daily.      fluorouracil 5 % External Cream Use twice weekly at night as directed x 6 weeks (Patient not taking: Reported on 8/21/2023) 40 g 1    gabapentin 300 MG Oral Cap Take 1 capsule (300 mg total) by mouth 3 (three) times daily.      AmLODIPine Besylate 5 MG Oral Tab Take 1 tablet (5 mg total) by mouth daily.      aspirin 81 MG Oral Chew Tab Chew by mouth.         PHYSICAL EXAM:  General: awake, alert, no acute distress  Skin: Skin exam was performed today including the following: head and face, scalp, neck, chest (including breasts and axillae), abdomen, back, bilateral upper  extremities, bilateral lower extremities, hands, feet, digits, nails. Pertinent findings include:   - Scattered bright red-purple dome-shaped papules on the trunk and extremities   - Scattered light brown stellate macules on sun exposed sites  - Scattered, evenly colored, round brown macules and papules with regular borders on the trunk and extremities  - Numerous scattered skin-colored and brown, waxy, stuck-on papules and plaques on the trunk and extremities      ASSESSMENT & PLAN:  Pathophysiology of diagnoses discussed with patient.  Therapeutic options reviewed. Risks, benefits, and alternatives discussed with patient. Instructions reviewed at length.    #Lentigines  #Seborrheic keratoses   #Cherry angiomas   - Reassurance provided regarding the benign nature of these lesions.    #Multiple benign nevi  - Complete skin exam performed today with no outlier lesions identified   - Reassured patient of benign nature of these lesions.   - Recommend daily photoprotection with broad-spectrum sunscreen, avoidance of sun during peak hours, and sun protective clothing.    - Dermoscopy was used for physical examination of pigmented lesions during today's office visit.    #Multiple actinic keratoses  - Discussed premalignant etiology and possibility of transformation to SCC  - Recommended cryotherapy today   - Discussed side effects including redness, swelling, crusting, and discolortion after treatment, wound care with soap/water and vaseline   - Recommend sun protection with spf 30 or higher, sun protective clothing such as wide brimmed hats and long sleeves. Recommend avoiding midday sun (10 am- 3 pm).     - Procedure Note Cryosurgery of pre-malignant lesion(s)  Risks, benefits, alternatives, complications, and personnel required for cryosurgery reviewed with patient. Patient verbalizes understanding and wishes to proceed.   - Cryosurgery performed with Liquid Nitrogen via cryostat spray gun to Actinic Keratosis . 7  lesion(s) treated.   - Patient tolerated well and wound care discussed. Return if lesions fail to fully resolve.    #Rosacea   - Ivermectin 0.9%, metronidazole 1% azelaic acid 15% niacinamide 5% compound once daily to face    #Lesion for monitoring - L abdomen  -At this time favor changing nevus  -Photos taken at appointment today   -We had an extensive discussion with the patient regarding the need for continued surveillance of the lesion(s) listed above for \"monitoring\". As explained during today's visit, the lesion(s) do not reflect, at this point, a level of concern that will ryan the need of a skin biopsy. The patient was in agreement with the monitoring approach. However, it will require the commitment from the patient to watch for future changes and to be seen at the proposed follow-up visit for re-evaluation of the lesion, or earlier in case of any concerning changes such as change in color, increase in size, symptoms including tenderness or pruritus, and bleeding    Return to clinic: 3 months or sooner if something concerning arises     Shamir Gonzales MD

## 2024-05-10 ENCOUNTER — PATIENT MESSAGE (OUTPATIENT)
Dept: DERMATOLOGY CLINIC | Facility: CLINIC | Age: 74
End: 2024-05-10

## 2024-05-11 NOTE — TELEPHONE ENCOUNTER
From: Eliceo Leung Sr.  To: Shamir Gonzales  Sent: 5/10/2024 7:59 PM CDT  Subject: [facility] Dr. Gonzales, You gave me a pamphlet for [facility] pharmacy and it says you sent a prescription in. What is it? I don't see it on mychart anywhere.  bill

## 2024-05-11 NOTE — TELEPHONE ENCOUNTER
Pt seen 5/9, per note    #Rosacea   - Ivermectin 0.9%, metronidazole 1% azelaic acid 15% niacinamide 5% compound once daily to face.    RX pended, I spoke with patient and he stated he can wait until Monday to have this sent.     Dr. Gonzales - per [facility], they mention azelaic acid 12% in this compound, please confirm you wanted 15%

## 2024-12-13 ENCOUNTER — OFFICE VISIT (OUTPATIENT)
Dept: DERMATOLOGY CLINIC | Facility: CLINIC | Age: 74
End: 2024-12-13

## 2024-12-13 DIAGNOSIS — Z08 ENCOUNTER FOR FOLLOW-UP SURVEILLANCE OF MELANOMA: ICD-10-CM

## 2024-12-13 DIAGNOSIS — L71.9 ROSACEA: ICD-10-CM

## 2024-12-13 DIAGNOSIS — Z85.820 ENCOUNTER FOR FOLLOW-UP SURVEILLANCE OF MELANOMA: ICD-10-CM

## 2024-12-13 DIAGNOSIS — D18.01 CHERRY ANGIOMA: ICD-10-CM

## 2024-12-13 DIAGNOSIS — Z08 ENCOUNTER FOR FOLLOW-UP SURVEILLANCE OF SKIN CANCER: ICD-10-CM

## 2024-12-13 DIAGNOSIS — L57.0 AK (ACTINIC KERATOSIS): Primary | ICD-10-CM

## 2024-12-13 DIAGNOSIS — L40.8 OTHER PSORIASIS: ICD-10-CM

## 2024-12-13 DIAGNOSIS — D23.9 BENIGN NEOPLASM OF SKIN, UNSPECIFIED LOCATION: ICD-10-CM

## 2024-12-13 DIAGNOSIS — Z85.828 ENCOUNTER FOR FOLLOW-UP SURVEILLANCE OF SKIN CANCER: ICD-10-CM

## 2024-12-13 DIAGNOSIS — L82.1 SEBORRHEIC KERATOSES: ICD-10-CM

## 2024-12-13 DIAGNOSIS — D22.9 MULTIPLE BENIGN NEVI: ICD-10-CM

## 2024-12-13 DIAGNOSIS — L81.4 LENTIGINES: ICD-10-CM

## 2024-12-13 PROCEDURE — 1160F RVW MEDS BY RX/DR IN RCRD: CPT | Performed by: DERMATOLOGY

## 2024-12-13 PROCEDURE — 1159F MED LIST DOCD IN RCRD: CPT | Performed by: DERMATOLOGY

## 2024-12-13 PROCEDURE — 17000 DESTRUCT PREMALG LESION: CPT | Performed by: DERMATOLOGY

## 2024-12-13 PROCEDURE — 99213 OFFICE O/P EST LOW 20 MIN: CPT | Performed by: DERMATOLOGY

## 2024-12-13 PROCEDURE — 17003 DESTRUCT PREMALG LES 2-14: CPT | Performed by: DERMATOLOGY

## 2024-12-13 RX ORDER — ATORVASTATIN CALCIUM 20 MG/1
20 TABLET, FILM COATED ORAL DAILY
COMMUNITY
Start: 2024-10-24

## 2024-12-13 RX ORDER — METOPROLOL SUCCINATE 50 MG/1
TABLET, EXTENDED RELEASE ORAL
COMMUNITY
Start: 2024-12-11

## 2024-12-26 NOTE — PROGRESS NOTES
Eliceo Leung . is a 74 year old male.  HPI:     CC:    Chief Complaint   Patient presents with    Upper Body Exam     LOV 2024 w/ DM. Hx of melanoma, SCC, and AKS.Pt present for upper body skin exam (Denied full body skin exam). Spot of concern on L Abdomen and L Neck that have become irritated.          Allergies:  Sulfa antibiotics    HISTORY:    Past Medical History:    High blood pressure    Invasive Melanoma of skin (HCC)    Left lateral elbow    Neuropathy    Psoriasis    Squamous cell skin cancer    L wrist      Past Surgical History:   Procedure Laterality Date    Adenoidectomy      Biopsy      nerve biopsy of foot    Tonsillectomy        History reviewed. No pertinent family history.   Social History     Socioeconomic History    Marital status:    Tobacco Use    Smoking status: Former     Current packs/day: 0.00     Types: Cigarettes     Quit date: 1989     Years since quittin.2     Passive exposure: Never    Smokeless tobacco: Never   Substance and Sexual Activity    Alcohol use: Yes     Comment: Occ    Drug use: Not Currently   Other Topics Concern    Grew up on a farm No    History of tanning Yes    Outdoor occupation No    Pt has a pacemaker No    Pt has a defibrillator No    Reaction to local anesthetic No     Social Drivers of Health     Food Insecurity: Low Risk  (10/15/2024)    Received from [facility]    Food Insecurity     Have there been times that your food ran out, and you didn't have money to get more?: No     Are there times that you worry that this might happen?: No   Transportation Needs: Low Risk  (10/15/2024)    Received from [facility]    Transportation Needs     Do you have trouble getting transportation to medical appointments?: No   Housing Stability: Low Risk  (10/15/2024)    Received from [facility]    Housing Stability     Are you worried that your electric, gas, oil, or water might be shut  off?: No     Are you concerned about having a safe and reliable place to live?: No        Current Outpatient Medications   Medication Sig Dispense Refill    atorvastatin 20 MG Oral Tab Take 1 tablet (20 mg total) by mouth daily.      metoprolol succinate ER 50 MG Oral Tablet 24 Hr       Misc. Devices Does not apply Misc Ivermectin 0.9%, metronidazole 1% azelaic acid 15% niacinamide 5% compound once daily to face 1 each 3    valsartan 40 MG Oral Tab Take 1 tablet (40 mg total) by mouth daily.      triamcinolone 0.1 % External Cream USE TWICE A DAY AS DIRECTED 454 g 1    Cholecalciferol 25 MCG (1000 UT) Oral Tab Take 2 tablets (2,000 Units total) by mouth daily.      aspirin 81 MG Oral Chew Tab Chew by mouth.      metoprolol succinate ER 25 MG Oral Tablet 24 Hr Take 1 tablet (25 mg total) by mouth daily. (Patient not taking: Reported on 12/13/2024)      atorvastatin 40 MG Oral Tab Take 1 tablet (40 mg total) by mouth daily. (Patient not taking: Reported on 12/13/2024)      metroNIDAZOLE 0.75 % External Gel Use bid to face (Patient not taking: Reported on 12/13/2024) 180 g 1    fluorouracil 5 % External Cream Use twice weekly at night as directed x 6 weeks (Patient not taking: Reported on 12/13/2024) 40 g 1    AmLODIPine Besylate 5 MG Oral Tab Take 1 tablet (5 mg total) by mouth daily.       Allergies:   Allergies   Allergen Reactions    Sulfa Antibiotics RASH       Past Medical History:    High blood pressure    Invasive Melanoma of skin (HCC)    Left lateral elbow    Neuropathy    Psoriasis    Squamous cell skin cancer    L wrist     Past Surgical History:   Procedure Laterality Date    Adenoidectomy      Biopsy      nerve biopsy of foot    Tonsillectomy       Social History     Socioeconomic History    Marital status:      Spouse name: Not on file    Number of children: Not on file    Years of education: Not on file    Highest education level: Not on file   Occupational History    Not on file   Tobacco Use     Smoking status: Former     Current packs/day: 0.00     Types: Cigarettes     Quit date: 1989     Years since quittin.2     Passive exposure: Never    Smokeless tobacco: Never   Substance and Sexual Activity    Alcohol use: Yes     Comment: Occ    Drug use: Not Currently    Sexual activity: Not on file   Other Topics Concern    Grew up on a farm No    History of tanning Yes    Outdoor occupation No    Pt has a pacemaker No    Pt has a defibrillator No    Reaction to local anesthetic No   Social History Narrative    Not on file     Social Drivers of Health     Financial Resource Strain: Not on file   Food Insecurity: Low Risk  (10/15/2024)    Received from [facility]    Food Insecurity     Have there been times that your food ran out, and you didn't have money to get more?: No     Are there times that you worry that this might happen?: No   Transportation Needs: Low Risk  (10/15/2024)    Received from [facility]    Transportation Needs     Do you have trouble getting transportation to medical appointments?: No     How do you normally get to and from your appointments?: Not on file   Physical Activity: Not on file   Stress: Not on file   Social Connections: Not on file   Housing Stability: Low Risk  (10/15/2024)    Received from [facility]    Housing Stability     Are you worried that your electric, gas, oil, or water might be shut off?: No     Are you concerned about having a safe and reliable place to live?: No     History reviewed. No pertinent family history.    There were no vitals filed for this visit.    HPI:    Chief Complaint   Patient presents with    Upper Body Exam     LOV 2024 w/ DM. Hx of melanoma, SCC, and AKS.Pt present for upper body skin exam (Denied full body skin exam). Spot of concern on L Abdomen and L Neck that have become irritated.        Past notes/ records and appropriate/relevant lab results including pathology and  past body maps reviewed. Updated and new information noted in current visit.     History of skin cancers including BCC, melanoma left lateral elbow 2020 SCC left wrist 2013, multiple AK's rosacea stable  Psoriasis better controlled with topicals     neuropathy controlled on gabapentin no further IVIG.    Post excision melanoma left arm no recurrence 0.3 mm    Patient presents with concerns above.  Right cheek lesion had improved and then return following had used fluorouracil on this occasional flareups in psoriasis. Has  Fluocinonide-- controls psoriasis.  Refill uses this sporadically.  Overalls been doing pretty well    Patient has been in their usual state of health.  History, medications, allergies reviewed as noted.      ROS:  Denies any other systemic complaints.  No new or changeing lesions other than noted above. No fevers, chills, night sweats, unusual sun sensitivity.  No other skin complaints.   A comprehensive 10 point review of systems was completed.  Pertinent positives and negatives noted in the the HPI.       History, medications, allergies reviewed as noted.       Physical Examination:     Well-developed well-nourished patient alert oriented in no acute distress.  Exam total-body performed, including scalp, head, neck, face,nails, hair, external eyes, including conjunctival mucosa, eyelids, lips external ears, back, chest,/ breasts, axillae,  abdomen, arms, legs, palms.     Multiple light to medium brown, well marginated, uniformly pigmented, macules and papules 6 mm and less scattered on exam. pigmented lesions examined with dermoscopy benign-appearing patterns.     Waxy tannish keratotic papules scattered, cherry-red vascular papules scattered.    See map today's date for lesions noted .  Scattered psoriasiform patches over The arms legs back..    Erythematous scaling keratotic papules right cheek nasal dorsum temples improved crusted patient using Efudex  No recurrence of ScC  left-hand    Otherwise remarkable for lesions as noted on map.    Assessment / plan:    No orders of the defined types were placed in this encounter.      Meds & Refills for this Visit:  Requested Prescriptions      No prescriptions requested or ordered in this encounter         Encounter Diagnoses   Name Primary?    AK (actinic keratosis) Yes    Seborrheic keratoses     Lentigines     Multiple benign nevi     Cherry angioma     Rosacea     Benign neoplasm of skin, unspecified location     Encounter for follow-up surveillance of melanoma     Encounter for follow-up surveillance of skin cancer     Other psoriasis        Lesion of concern at left upper abdomen 5 to 6 mm irregular brown macule see photos.  Plan recheck in 3 to 4 months.  Continue careful monitoring given history of melanoma, BCC AK's psoriasis.    Erythematous scaling keratotic papules noted Actinic Keratoses.  Precancerous nature discussed. Sun protection, sunscreen/ blocks encouraged Lesions treated with cryo- .  Biopsy if not resolved.    x left forehead temple2    Melanoma left elbow 9/2020 0.3 mm superficial spreading.  Jaskaran level II stable doing well no adenopathy no recurrence    Overall AK's fairly stable.  Continue careful sun protection.  Consider repeat Efudex forehead nose ears.  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    At left postauricular neck tannish papule observe.    Actinic keratoses.  Precancerous nature discussed.  Continue careful sun protection.      Cystic papule left neck observe.  Stable  Patient with history of skin cancer.  No evidence of recurrence.        Psoriasis.  No arthritis.  Continue topicals.  Requests refill of ketoconazole for intertriginous lesions.  Continue monitoring.  Continue topicals.  Seems better presently.  Continue careful observation still plaques arms legs.  Scalp improved      .Rosacea.  Overall stable rhinophyma slow progression meds in grid.  Skin care instructions  reviewed.  Pathophysiology reviewed.  Chronic recurrent nature discussed.  Patient will let us know how they are doing over the next several weeks.  Await clinical response to above therapy.  Metronidazole as needed.  Rhinophyma.  Discussed various therapies.  More keratotic papule over the nasal dorsum trial of cryo.  Await response.  Recheck 4 months    Please refer to map for specific lesions.  See additional diagnoses.  Pros cons of various therapies, risks benefits discussed.Pathophysiology discussed with patient.  Therapeutic options reviewed.  See  Medications in grid.  Instructions reviewed at length.    Benign nevi, seborrheic  keratoses, cherry angiomas:  Reassurance regarding other benign skin lesions.Signs and symptoms of skin cancer, ABCDE's of melanoma discussed with patient. Sunscreen use, sun protection, self exams reviewed.  Followup as noted RTC routine checkup 6 mos - one year or p.r.n.    RTC 4-6 months  The patient indicates understanding of these issues and agrees to the plan.  The patient is asked to return as noted in follow-up/ above.    This note was generated using Dragon voice recognition software.  Please contact me regarding any confusion resulting from errors in recognition.    Encounter Times  Including precharting, reviewing chart, prior notes obtaining history: 5 minutes, medical exam :10 minutes, notes on body map, plan, counseling 10minutes My total time spent caring for the patient on the day of the encounter: 25 minutes       Eliceo Leung  is a 74 year old male.  HPI:     CC:    Chief Complaint   Patient presents with    Upper Body Exam     LOV 5/2024 w/ DM. Hx of melanoma, SCC, and AKS.Pt present for upper body skin exam (Denied full body skin exam). Spot of concern on L Abdomen and L Neck that have become irritated.          Allergies:  Sulfa antibiotics    HISTORY:    Past Medical History:    High blood pressure    Invasive Melanoma of skin (HCC)    Left lateral elbow     Neuropathy    Psoriasis    Squamous cell skin cancer    L wrist      Past Surgical History:   Procedure Laterality Date    Adenoidectomy      Biopsy      nerve biopsy of foot    Tonsillectomy        History reviewed. No pertinent family history.   Social History     Socioeconomic History    Marital status:    Tobacco Use    Smoking status: Former     Current packs/day: 0.00     Types: Cigarettes     Quit date: 1989     Years since quittin.2     Passive exposure: Never    Smokeless tobacco: Never   Substance and Sexual Activity    Alcohol use: Yes     Comment: Occ    Drug use: Not Currently   Other Topics Concern    Grew up on a farm No    History of tanning Yes    Outdoor occupation No    Pt has a pacemaker No    Pt has a defibrillator No    Reaction to local anesthetic No     Social Drivers of Health     Food Insecurity: Low Risk  (10/15/2024)    Received from [facility]    Food Insecurity     Have there been times that your food ran out, and you didn't have money to get more?: No     Are there times that you worry that this might happen?: No   Transportation Needs: Low Risk  (10/15/2024)    Received from [facility]    Transportation Needs     Do you have trouble getting transportation to medical appointments?: No   Housing Stability: Low Risk  (10/15/2024)    Received from [facility]    Housing Stability     Are you worried that your electric, gas, oil, or water might be shut off?: No     Are you concerned about having a safe and reliable place to live?: No        Current Outpatient Medications   Medication Sig Dispense Refill    atorvastatin 20 MG Oral Tab Take 1 tablet (20 mg total) by mouth daily.      metoprolol succinate ER 50 MG Oral Tablet 24 Hr       Misc. Devices Does not apply Misc Ivermectin 0.9%, metronidazole 1% azelaic acid 15% niacinamide 5% compound once daily to face 1 each 3    valsartan 40 MG Oral Tab Take 1  tablet (40 mg total) by mouth daily.      triamcinolone 0.1 % External Cream USE TWICE A DAY AS DIRECTED 454 g 1    Cholecalciferol 25 MCG (1000 UT) Oral Tab Take 2 tablets (2,000 Units total) by mouth daily.      aspirin 81 MG Oral Chew Tab Chew by mouth.      metoprolol succinate ER 25 MG Oral Tablet 24 Hr Take 1 tablet (25 mg total) by mouth daily. (Patient not taking: Reported on 2024)      atorvastatin 40 MG Oral Tab Take 1 tablet (40 mg total) by mouth daily. (Patient not taking: Reported on 2024)      metroNIDAZOLE 0.75 % External Gel Use bid to face (Patient not taking: Reported on 2024) 180 g 1    fluorouracil 5 % External Cream Use twice weekly at night as directed x 6 weeks (Patient not taking: Reported on 2024) 40 g 1    AmLODIPine Besylate 5 MG Oral Tab Take 1 tablet (5 mg total) by mouth daily.       Allergies:   Allergies   Allergen Reactions    Sulfa Antibiotics RASH       Past Medical History:    High blood pressure    Invasive Melanoma of skin (HCC)    Left lateral elbow    Neuropathy    Psoriasis    Squamous cell skin cancer    L wrist     Past Surgical History:   Procedure Laterality Date    Adenoidectomy      Biopsy      nerve biopsy of foot    Tonsillectomy       Social History     Socioeconomic History    Marital status:      Spouse name: Not on file    Number of children: Not on file    Years of education: Not on file    Highest education level: Not on file   Occupational History    Not on file   Tobacco Use    Smoking status: Former     Current packs/day: 0.00     Types: Cigarettes     Quit date: 1989     Years since quittin.2     Passive exposure: Never    Smokeless tobacco: Never   Substance and Sexual Activity    Alcohol use: Yes     Comment: Occ    Drug use: Not Currently    Sexual activity: Not on file   Other Topics Concern    Grew up on a farm No    History of tanning Yes    Outdoor occupation No    Pt has a pacemaker No    Pt has a  defibrillator No    Reaction to local anesthetic No   Social History Narrative    Not on file     Social Drivers of Health     Financial Resource Strain: Not on file   Food Insecurity: Low Risk  (10/15/2024)    Received from [facility]    Food Insecurity     Have there been times that your food ran out, and you didn't have money to get more?: No     Are there times that you worry that this might happen?: No   Transportation Needs: Low Risk  (10/15/2024)    Received from [facility]    Transportation Needs     Do you have trouble getting transportation to medical appointments?: No     How do you normally get to and from your appointments?: Not on file   Physical Activity: Not on file   Stress: Not on file   Social Connections: Not on file   Housing Stability: Low Risk  (10/15/2024)    Received from [facility]    Housing Stability     Are you worried that your electric, gas, oil, or water might be shut off?: No     Are you concerned about having a safe and reliable place to live?: No     History reviewed. No pertinent family history.    There were no vitals filed for this visit.    HPI:    Chief Complaint   Patient presents with    Upper Body Exam     LOV 5/2024 w/ DM. Hx of melanoma, SCC, and AKS.Pt present for upper body skin exam (Denied full body skin exam). Spot of concern on L Abdomen and L Neck that have become irritated.        Past notes/ records and appropriate/relevant lab results including pathology and past body maps reviewed. Updated and new information noted in current visit.     Recent medical history of lobectomy, no additional chemo or radiation was needed.  Doing well.   Lesion of concern to the right collarbone area and left ear    Follow-up concerned lesion on leg left.  Has come up fairly recently.  Request refill of triamcinolone.  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    History of skin cancers  including BCC, melanoma left lateral elbow 2020 SCC left wrist 2013, multiple AK's rosacea stable  Psoriasis better -controlled with topicals     neuropathy improved off gabapentin no further IVIG.    Post excision melanoma left arm no recurrence 0.3 mm    Patient presents with concerns above.  Right cheek lesion had improved and then return following had used fluorouracil on this occasional flareups in psoriasis. Has  Fluocinonide-- controls psoriasis.  Refill uses this sporadically.  Overalls been doing pretty well    Patient has been in their usual state of health.  History, medications, allergies reviewed as noted.      ROS:  Denies any other systemic complaints.  No new or changeing lesions other than noted above. No fevers, chills, night sweats, unusual sun sensitivity.  No other skin complaints.   A comprehensive 10 point review of systems was completed.  Pertinent positives and negatives noted in the the HPI.       History, medications, allergies reviewed as noted.       Physical Examination:     Well-developed well-nourished patient alert oriented in no acute distress.  Exam total-body performed, including scalp, head, neck, face,nails, hair, external eyes, including conjunctival mucosa, eyelids, lips external ears, back, chest,/ breasts, axillae,  abdomen, arms, legs, palms.     Multiple light to medium brown, well marginated, uniformly pigmented, macules and papules 6 mm and less scattered on exam. pigmented lesions examined with dermoscopy benign-appearing patterns.     Waxy tannish keratotic papules scattered, cherry-red vascular papules scattered.    See map today's date for lesions noted .  Scattered psoriasiform patches over The arms legs back..    Erythematous scaling keratotic papules right cheek nasal dorsum temples improved crusted patient using Efudex  No recurrence of ScC left-hand    Otherwise remarkable for lesions as noted on map.    Assessment / plan:    No orders of the defined types were  placed in this encounter.      Meds & Refills for this Visit:  Requested Prescriptions      No prescriptions requested or ordered in this encounter         Encounter Diagnoses   Name Primary?    AK (actinic keratosis) Yes    Seborrheic keratoses     Lentigines     Multiple benign nevi     Cherry angioma     Rosacea     Benign neoplasm of skin, unspecified location     Encounter for follow-up surveillance of melanoma     Encounter for follow-up surveillance of skin cancer     Other psoriasis           Lesion of concern to the right collarbone area and left ear  Benign keratoses reassurance.  Trial of cryo.  Area of concern at left posterior calf psoriasiform patch.  Nevus within this has been unchanged.  Stable.    Lesion of concern at left upper abdomen 5 to 6 mm irregular brown macule see photos.  Stable plan recheck in 3 to 4 months.  Continue careful monitoring given history of melanoma, BCC AK's psoriasis.  Lesion stable    Erythematous scaling keratotic papules noted at sites noted on map  Actinic Keratoses.  Precancerous nature discussed. Sun protection, sunscreen/ blocks encouraged Lesions treated with cryo- .  Biopsy if not resolved.    Forehead, temples  X3    Continue Efudex as needed to areas of scaling on face.  Scaling area at upper central lip -this has improved -will observe carefully    Multiple medium brown macules 5 to 6 mm uniform and dermoscopy at posterior thighs, calf observed.  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  Jaskaran level II stable doing well no adenopathy no recurrence    At left postauricular neck tannish papule observe.    Actinic keratoses.  Precancerous nature discussed.  Continue careful sun protection.      Cystic papule left neck observe.  Stable  Patient with history of skin cancer.  No evidence of recurrence.        Psoriasis.  No arthritis.  Continue topicals.  Requests refill of ketoconazole for intertriginous lesions.  Continue  monitoring.  Continue topicals.  Seems better presently.  Continue careful observation still plaques arms legs.  Scalp improved  Overall stable continue triamcinolone as needed  Triamcinolone working continue    .Rosacea.  Overall stable rhinophyma slow progression meds in grid.  Skin care instructions reviewed.  Pathophysiology reviewed.  Chronic recurrent nature discussed.  Patient will let us know how they are doing over the next several weeks.  Await clinical response to above therapy.  Metronidazole as needed.  Rhinophyma.  Discussed various therapies.  More keratotic papule over the nasal dorsum trial of cryo.  Await response.  Recheck 4 months  Patient currently using compound from [employer] which has been very helpful from DM    Blood pressure increased post COVID doing better continues on metoprolol not taking valsartan or amlodipine more lower blood pressure after his lung surgery    Please refer to map for specific lesions.  See additional diagnoses.  Pros cons of various therapies, risks benefits discussed.Pathophysiology discussed with patient.  Therapeutic options reviewed.  See  Medications in grid.  Instructions reviewed at length.    Benign nevi, seborrheic  keratoses, cherry angiomas:  Reassurance regarding other benign skin lesions.    General skin care questions answered.   Reassurance regarding benign skin lesions.    Monitor for new or changing lesions. Signs and symptoms of skin cancer, ABCDE's of melanoma ( additional information available at AAD.org, skincancer.org) Encourage Sunscreen (broad-spectrum, ideally mineral-based-UVA/UVB -SPF 30 or higher) use encouraged, sun protection/sun protective clothing, self exams reviewed Followup as noted RTC ---routine checkup 6 mos -one year or p.r.n.    Encounter Times   Including precharting, reviewing chart, prior notes obtaining history: 10 minutes, medical exam :10 minutes, notes on body map, plan, counseling 10minutes My total time spent caring  for the patient on the day of the encounter: 30 minutes     The patient indicates understanding of these issues and agrees to the plan.  The patient is asked to return as noted in follow-up/ above.    This note was generated using Dragon voice recognition software.  Please contact me regarding any confusion resulting from errors in recognition..  Note to patient and family: The 21st Century Cures Act makes medical notes like these available to patients. However, be advised this is a medical document. It is intended as [id number] communication and monitoring of a patient's care needs. It is written in medical language and may contain abbreviations or verbiage that are unfamiliar. It may appear blunt or direct. Medical documents are intended to carry relevant information, facts as evident and the clinical opinion of the practitioner.

## 2025-01-22 NOTE — TELEPHONE ENCOUNTER
Refill Request for medication(s): triamcinolone 0.1 % External Cream     Last Office Visit: 12/13/24    Last Refill: 08/21/23    Pharmacy, Dosage verified:   [id number] - [facility] [address] AT [facility] [address] [facility] [address], [phone], [phone]       Condition Update (if applicable): Condition update sent via [employer] msg    Rx pended and sent to provider for approval, please advise. Thank You!

## 2025-01-23 RX ORDER — TRIAMCINOLONE ACETONIDE 1 MG/G
CREAM TOPICAL
Qty: 454 G | Refills: 1 | Status: SHIPPED | OUTPATIENT
Start: 2025-01-23

## 2025-02-12 RX ORDER — FLUOROURACIL 50 MG/G
CREAM TOPICAL
Qty: 40 G | Refills: 0 | Status: SHIPPED | OUTPATIENT
Start: 2025-02-12

## 2025-02-12 NOTE — TELEPHONE ENCOUNTER
Refill Request for medication(s):   fluorouracil 5 % External Cream     Last Office Visit:  12/13/24    Last Refill: 10/26/19    Pharmacy, Dosage verified:   [facility] #[id number] - [facility] [address] AT [facility] [address], [phone], [phone]       Condition Update (if applicable): [facility] msg sent for condition update.     Rx pended and sent to provider for approval, please advise. Thank You!

## 2025-03-14 ENCOUNTER — OFFICE VISIT (OUTPATIENT)
Dept: DERMATOLOGY CLINIC | Facility: CLINIC | Age: 75
End: 2025-03-14

## 2025-03-14 DIAGNOSIS — Z08 ENCOUNTER FOR FOLLOW-UP SURVEILLANCE OF MELANOMA: ICD-10-CM

## 2025-03-14 DIAGNOSIS — L71.9 ROSACEA: ICD-10-CM

## 2025-03-14 DIAGNOSIS — L81.4 LENTIGINES: ICD-10-CM

## 2025-03-14 DIAGNOSIS — Z08 ENCOUNTER FOR FOLLOW-UP SURVEILLANCE OF SKIN CANCER: ICD-10-CM

## 2025-03-14 DIAGNOSIS — L57.0 AK (ACTINIC KERATOSIS): Primary | ICD-10-CM

## 2025-03-14 DIAGNOSIS — D22.9 MULTIPLE BENIGN NEVI: ICD-10-CM

## 2025-03-14 DIAGNOSIS — Z85.828 ENCOUNTER FOR FOLLOW-UP SURVEILLANCE OF SKIN CANCER: ICD-10-CM

## 2025-03-14 DIAGNOSIS — L82.1 SEBORRHEIC KERATOSES: ICD-10-CM

## 2025-03-14 DIAGNOSIS — Z85.820 ENCOUNTER FOR FOLLOW-UP SURVEILLANCE OF MELANOMA: ICD-10-CM

## 2025-03-14 DIAGNOSIS — L40.8 OTHER PSORIASIS: ICD-10-CM

## 2025-03-14 DIAGNOSIS — D18.01 CHERRY ANGIOMA: ICD-10-CM

## 2025-03-14 PROCEDURE — 99213 OFFICE O/P EST LOW 20 MIN: CPT | Performed by: DERMATOLOGY

## 2025-03-14 PROCEDURE — G2211 COMPLEX E/M VISIT ADD ON: HCPCS | Performed by: DERMATOLOGY

## 2025-03-14 PROCEDURE — 1160F RVW MEDS BY RX/DR IN RCRD: CPT | Performed by: DERMATOLOGY

## 2025-03-14 PROCEDURE — 1159F MED LIST DOCD IN RCRD: CPT | Performed by: DERMATOLOGY

## 2025-03-14 RX ORDER — SPIRONOLACTONE 25 MG/1
TABLET ORAL
COMMUNITY
Start: 2025-01-14

## 2025-03-14 NOTE — PROGRESS NOTES
The following individual(s) verbally consented to be recorded using ambient AI listening technology and understand that they can each withdraw their consent to this listening technology at any point by asking the clinician to turn off or pause the recording:    Patient name: Eliceo Leung Sr.

## 2025-03-23 NOTE — PROGRESS NOTES
Eliceo Leung . is a 74 year old male.  HPI:     CC:    Chief Complaint   Patient presents with    Full Skin Exam     LOV 24. Pt presents for FBSE. Pt has a red lesion of concern on right forearm since 1 wk.     Pt has hx of  Invasive Melanoma, SCC and AK          Allergies:  Sulfa antibiotics    HISTORY:    Past Medical History:    High blood pressure    Invasive Melanoma of skin (HCC)    Left lateral elbow    Neuropathy    Psoriasis    Squamous cell skin cancer    L wrist      Past Surgical History:   Procedure Laterality Date    Adenoidectomy      Biopsy      nerve biopsy of foot    Tonsillectomy        History reviewed. No pertinent family history.   Social History     Socioeconomic History    Marital status:    Tobacco Use    Smoking status: Former     Current packs/day: 0.00     Types: Cigarettes     Quit date: 1989     Years since quittin.5     Passive exposure: Never    Smokeless tobacco: Never   Substance and Sexual Activity    Alcohol use: Yes     Comment: Occ    Drug use: Not Currently   Other Topics Concern    Grew up on a farm No    History of tanning Yes    Outdoor occupation No    Pt has a pacemaker No    Pt has a defibrillator No    Reaction to local anesthetic No     Social Drivers of Health     Food Insecurity: Low Risk  (10/15/2024)    Received from [facility]    Food Insecurity     Have there been times that your food ran out, and you didn't have money to get more?: No     Are there times that you worry that this might happen?: No   Transportation Needs: Low Risk  (10/15/2024)    Received from [facility]    Transportation Needs     Do you have trouble getting transportation to medical appointments?: No   Housing Stability: Low Risk  (10/15/2024)    Received from [facility]    Housing Stability     Are you worried that your electric, gas, oil, or water might be shut off?: No     Are you concerned about  having a safe and reliable place to live?: No        Current Outpatient Medications   Medication Sig Dispense Refill    spironolactone 25 MG Oral Tab Spironolactone Oral        active      fluorouracil 5 % External Cream Use twice weekly at night as directed x 6 weeks 40 g 0    triamcinolone 0.1 % External Cream APPLY TO THE AFFECTED AREA TWICE DAILY AS DIRECTED 454 g 1    atorvastatin 20 MG Oral Tab Take 1 tablet (20 mg total) by mouth daily.      metoprolol succinate ER 50 MG Oral Tablet 24 Hr       Misc. Devices Does not apply Misc Ivermectin 0.9%, metronidazole 1% azelaic acid 15% niacinamide 5% compound once daily to face 1 each 3    Cholecalciferol 25 MCG (1000 UT) Oral Tab Take 2 tablets (2,000 Units total) by mouth daily.      aspirin 81 MG Oral Chew Tab Chew by mouth.      metoprolol succinate ER 25 MG Oral Tablet 24 Hr Take 1 tablet (25 mg total) by mouth daily. (Patient not taking: Reported on 3/14/2025)      valsartan 40 MG Oral Tab Take 1 tablet (40 mg total) by mouth daily. (Patient not taking: Reported on 3/14/2025)      atorvastatin 40 MG Oral Tab Take 1 tablet (40 mg total) by mouth daily. (Patient not taking: Reported on 3/14/2025)      metroNIDAZOLE 0.75 % External Gel Use bid to face (Patient not taking: Reported on 3/14/2025) 180 g 1    AmLODIPine Besylate 5 MG Oral Tab Take 1 tablet (5 mg total) by mouth daily.       Allergies:   Allergies   Allergen Reactions    Sulfa Antibiotics RASH       Past Medical History:    High blood pressure    Invasive Melanoma of skin (HCC)    Left lateral elbow    Neuropathy    Psoriasis    Squamous cell skin cancer    L wrist     Past Surgical History:   Procedure Laterality Date    Adenoidectomy      Biopsy      nerve biopsy of foot    Tonsillectomy       Social History     Socioeconomic History    Marital status:      Spouse name: Not on file    Number of children: Not on file    Years of education: Not on file    Highest education level: Not on file    Occupational History    Not on file   Tobacco Use    Smoking status: Former     Current packs/day: 0.00     Types: Cigarettes     Quit date: 1989     Years since quittin.5     Passive exposure: Never    Smokeless tobacco: Never   Substance and Sexual Activity    Alcohol use: Yes     Comment: Occ    Drug use: Not Currently    Sexual activity: Not on file   Other Topics Concern    Grew up on a farm No    History of tanning Yes    Outdoor occupation No    Pt has a pacemaker No    Pt has a defibrillator No    Reaction to local anesthetic No   Social History Narrative    Not on file     Social Drivers of Health     Food Insecurity: Low Risk  (10/15/2024)    Received from [facility]    Food Insecurity     Have there been times that your food ran out, and you didn't have money to get more?: No     Are there times that you worry that this might happen?: No   Transportation Needs: Low Risk  (10/15/2024)    Received from [facility]    Transportation Needs     Do you have trouble getting transportation to medical appointments?: No     How do you normally get to and from your appointments?: Not on file   Stress: Not on file   Housing Stability: Low Risk  (10/15/2024)    Received from [facility]    Housing Stability     Are you worried that your electric, gas, oil, or water might be shut off?: No     Are you concerned about having a safe and reliable place to live?: No     History reviewed. No pertinent family history.    There were no vitals filed for this visit.    HPI:    Chief Complaint   Patient presents with    Full Skin Exam     LOV 24. Pt presents for FBSE. Pt has a red lesion of concern on right forearm since 1 wk.     Pt has hx of  Invasive Melanoma, SCC and AK        Past notes/ records and appropriate/relevant lab results including pathology and past body maps reviewed. Updated and new information noted in current visit.     History  of skin cancers including BCC, melanoma left lateral elbow 2020 SCC left wrist 2013, multiple AK's rosacea stable  Psoriasis better controlled with topicals     neuropathy controlled on gabapentin no further IVIG.    Post excision melanoma left arm no recurrence 0.3 mm    Patient presents with concerns above.  Right cheek lesion had improved and then return following had used fluorouracil on this occasional flareups in psoriasis. Has  Fluocinonide-- controls psoriasis.  Refill uses this sporadically.  Overalls been doing pretty well    Patient has been in their usual state of health.  History, medications, allergies reviewed as noted.      ROS:  Denies any other systemic complaints.  No new or changeing lesions other than noted above. No fevers, chills, night sweats, unusual sun sensitivity.  No other skin complaints.   A comprehensive 10 point review of systems was completed.  Pertinent positives and negatives noted in the the HPI.       History, medications, allergies reviewed as noted.       Physical Examination:     Well-developed well-nourished patient alert oriented in no acute distress.  Exam total-body performed, including scalp, head, neck, face,nails, hair, external eyes, including conjunctival mucosa, eyelids, lips external ears, back, chest,/ breasts, axillae,  abdomen, arms, legs, palms.     Multiple light to medium brown, well marginated, uniformly pigmented, macules and papules 6 mm and less scattered on exam. pigmented lesions examined with dermoscopy benign-appearing patterns.     Waxy tannish keratotic papules scattered, cherry-red vascular papules scattered.    See map today's date for lesions noted .  Scattered psoriasiform patches over The arms legs back..    Erythematous scaling keratotic papules right cheek nasal dorsum temples improved crusted patient using Efudex  No recurrence of ScC left-hand    Otherwise remarkable for lesions as noted on map.    Assessment / plan:    No orders of the  defined types were placed in this encounter.      Meds & Refills for this Visit:  Requested Prescriptions      No prescriptions requested or ordered in this encounter         Encounter Diagnoses   Name Primary?    AK (actinic keratosis) Yes    Other psoriasis     Seborrheic keratoses     Lentigines     Multiple benign nevi     Cherry angioma     Rosacea     Encounter for follow-up surveillance of melanoma     Encounter for follow-up surveillance of skin cancer        Lesion of concern at left upper abdomen 5 to 6 mm irregular brown macule see photos.  Plan recheck in 3 to 4 months.  Continue careful monitoring given history of melanoma, BCC AK's psoriasis.    Erythematous scaling keratotic papules noted Actinic Keratoses.  Precancerous nature discussed. Sun protection, sunscreen/ blocks encouraged Lesions treated with cryo- .  Biopsy if not resolved.    x left forehead temple2    Melanoma left elbow 9/2020 0.3 mm superficial spreading.  Jaskaran level II stable doing well no adenopathy no recurrence    Overall AK's fairly stable.  Continue careful sun protection.  Consider repeat Efudex forehead nose ears.  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    At left postauricular neck tannish papule observe.    Actinic keratoses.  Precancerous nature discussed.  Continue careful sun protection.      Cystic papule left neck observe.  Stable  Patient with history of skin cancer.  No evidence of recurrence.        Psoriasis.  No arthritis.  Continue topicals.  Requests refill of ketoconazole for intertriginous lesions.  Continue monitoring.  Continue topicals.  Seems better presently.  Continue careful observation still plaques arms legs.  Scalp improved      .Rosacea.  Overall stable rhinophyma slow progression meds in grid.  Skin care instructions reviewed.  Pathophysiology reviewed.  Chronic recurrent nature discussed.  Patient will let us know how they are doing over the next several weeks.  Await  clinical response to above therapy.  Metronidazole as needed.  Rhinophyma.  Discussed various therapies.  More keratotic papule over the nasal dorsum trial of cryo.  Await response.  Recheck 4 months    Please refer to map for specific lesions.  See additional diagnoses.  Pros cons of various therapies, risks benefits discussed.Pathophysiology discussed with patient.  Therapeutic options reviewed.  See  Medications in grid.  Instructions reviewed at length.    Benign nevi, seborrheic  keratoses, cherry angiomas:  Reassurance regarding other benign skin lesions.Signs and symptoms of skin cancer, ABCDE's of melanoma discussed with patient. Sunscreen use, sun protection, self exams reviewed.  Followup as noted RTC routine checkup 6 mos - one year or p.r.n.    RTC 4-6 months  The patient indicates understanding of these issues and agrees to the plan.  The patient is asked to return as noted in follow-up/ above.    This note was generated using Dragon voice recognition software.  Please contact me regarding any confusion resulting from errors in recognition.    Encounter Times  Including precharting, reviewing chart, prior notes obtaining history: 5 minutes, medical exam :10 minutes, notes on body map, plan, counseling 10minutes My total time spent caring for the patient on the day of the encounter: 25 minutes       Eliceo Leung Sr. is a 74 year old male.  HPI:     CC:    Chief Complaint   Patient presents with    Full Skin Exam     LOV 12/13/24. Pt presents for FBSE. Pt has a red lesion of concern on right forearm since 1 wk.     Pt has hx of  Invasive Melanoma, SCC and AK          Allergies:  Sulfa antibiotics    HISTORY:    Past Medical History:    High blood pressure    Invasive Melanoma of skin (HCC)    Left lateral elbow    Neuropathy    Psoriasis    Squamous cell skin cancer    L wrist      Past Surgical History:   Procedure Laterality Date    Adenoidectomy      Biopsy      nerve biopsy of foot     Tonsillectomy        History reviewed. No pertinent family history.   Social History     Socioeconomic History    Marital status:    Tobacco Use    Smoking status: Former     Current packs/day: 0.00     Types: Cigarettes     Quit date: 1989     Years since quittin.5     Passive exposure: Never    Smokeless tobacco: Never   Substance and Sexual Activity    Alcohol use: Yes     Comment: Occ    Drug use: Not Currently   Other Topics Concern    Grew up on a farm No    History of tanning Yes    Outdoor occupation No    Pt has a pacemaker No    Pt has a defibrillator No    Reaction to local anesthetic No     Social Drivers of Health     Food Insecurity: Low Risk  (10/15/2024)    Received from [facility]    Food Insecurity     Have there been times that your food ran out, and you didn't have money to get more?: No     Are there times that you worry that this might happen?: No   Transportation Needs: Low Risk  (10/15/2024)    Received from [facility]    Transportation Needs     Do you have trouble getting transportation to medical appointments?: No   Housing Stability: Low Risk  (10/15/2024)    Received from [facility]    Housing Stability     Are you worried that your electric, gas, oil, or water might be shut off?: No     Are you concerned about having a safe and reliable place to live?: No        Current Outpatient Medications   Medication Sig Dispense Refill    spironolactone 25 MG Oral Tab Spironolactone Oral        active      fluorouracil 5 % External Cream Use twice weekly at night as directed x 6 weeks 40 g 0    triamcinolone 0.1 % External Cream APPLY TO THE AFFECTED AREA TWICE DAILY AS DIRECTED 454 g 1    atorvastatin 20 MG Oral Tab Take 1 tablet (20 mg total) by mouth daily.      metoprolol succinate ER 50 MG Oral Tablet 24 Hr       Misc. Devices Does not apply Misc Ivermectin 0.9%, metronidazole 1% azelaic acid 15% niacinamide 5%  compound once daily to face 1 each 3    Cholecalciferol 25 MCG (1000 UT) Oral Tab Take 2 tablets (2,000 Units total) by mouth daily.      aspirin 81 MG Oral Chew Tab Chew by mouth.      metoprolol succinate ER 25 MG Oral Tablet 24 Hr Take 1 tablet (25 mg total) by mouth daily. (Patient not taking: Reported on 3/14/2025)      valsartan 40 MG Oral Tab Take 1 tablet (40 mg total) by mouth daily. (Patient not taking: Reported on 3/14/2025)      atorvastatin 40 MG Oral Tab Take 1 tablet (40 mg total) by mouth daily. (Patient not taking: Reported on 3/14/2025)      metroNIDAZOLE 0.75 % External Gel Use bid to face (Patient not taking: Reported on 3/14/2025) 180 g 1    AmLODIPine Besylate 5 MG Oral Tab Take 1 tablet (5 mg total) by mouth daily.       Allergies:   Allergies   Allergen Reactions    Sulfa Antibiotics RASH       Past Medical History:    High blood pressure    Invasive Melanoma of skin (HCC)    Left lateral elbow    Neuropathy    Psoriasis    Squamous cell skin cancer    L wrist     Past Surgical History:   Procedure Laterality Date    Adenoidectomy      Biopsy      nerve biopsy of foot    Tonsillectomy       Social History     Socioeconomic History    Marital status:      Spouse name: Not on file    Number of children: Not on file    Years of education: Not on file    Highest education level: Not on file   Occupational History    Not on file   Tobacco Use    Smoking status: Former     Current packs/day: 0.00     Types: Cigarettes     Quit date: 1989     Years since quittin.5     Passive exposure: Never    Smokeless tobacco: Never   Substance and Sexual Activity    Alcohol use: Yes     Comment: Occ    Drug use: Not Currently    Sexual activity: Not on file   Other Topics Concern    Grew up on a farm No    History of tanning Yes    Outdoor occupation No    Pt has a pacemaker No    Pt has a defibrillator No    Reaction to local anesthetic No   Social History Narrative    Not on file     Social  Drivers of Health     Food Insecurity: Low Risk  (10/15/2024)    Received from [facility]    Food Insecurity     Have there been times that your food ran out, and you didn't have money to get more?: No     Are there times that you worry that this might happen?: No   Transportation Needs: Low Risk  (10/15/2024)    Received from [facility]    Transportation Needs     Do you have trouble getting transportation to medical appointments?: No     How do you normally get to and from your appointments?: Not on file   Stress: Not on file   Housing Stability: Low Risk  (10/15/2024)    Received from [facility]    Housing Stability     Are you worried that your electric, gas, oil, or water might be shut off?: No     Are you concerned about having a safe and reliable place to live?: No     History reviewed. No pertinent family history.    There were no vitals filed for this visit.    HPI:    Chief Complaint   Patient presents with    Full Skin Exam     LOV 12/13/24. Pt presents for FBSE. Pt has a red lesion of concern on right forearm since 1 wk.     Pt has hx of  Invasive Melanoma, SCC and AK        Past notes/ records and appropriate/relevant lab results including pathology and past body maps reviewed. Updated and new information noted in current visit.     history of lung cancer lobectomy, no additional chemo or radiation was needed.  Doing well.   Lesion of concern to the right collarbone area and left ear    triamcinolone.  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    History of skin cancers including BCC, melanoma left lateral elbow 2020 SCC left wrist 2013, multiple AK's rosacea stable  Psoriasis better -controlled with topicals     neuropathy improved off gabapentin no further IVIG.    Post excision melanoma left arm no recurrence 0.3 mm      History of Present Illness  Eliceo Leung Sr. is a 74 year old  male with a history of melanoma who presents with concern about a new skin spot.    He noticed a new spot on his skin that appeared similar to a previous melanoma, located in the same area. The spot is now almost gone, having appeared about a week ago. He describes himself as bruising easily and notes that sometimes these spots look very dark initially. He was able to get an appointment quickly due to his concern about the spot.    He has a history of psoriasis, which he manages with topical treatments. He applies fluorouracil twice a week. The psoriasis tends to flare up if not applying triamcinolone consistently  He reports a history of neuropathy, which has improved since he started avoiding sugar. He has lost approximately 40 to 45 pounds over the past three years by cutting out sugar and other dietary changes. The weight loss was not entirely intentional but was aimed at reducing belly fat and improving his overall health. He feels fine and believes his current weight is more appropriate for his bone structure.      Patient presents with concerns above.  Right cheek lesion had improved and then return following had used fluorouracil on this occasional flareups in psoriasis. Has  Fluocinonide-- controls psoriasis.  Refill uses this sporadically.  Overalls been doing pretty well    Patient has been in their usual state of health.  History, medications, allergies reviewed as noted.      ROS:  Denies any other systemic complaints.  No new or changeing lesions other than noted above. No fevers, chills, night sweats, unusual sun sensitivity.  No other skin complaints.   A comprehensive 10 point review of systems was completed.  Pertinent positives and negatives noted in the the HPI.       History, medications, allergies reviewed as noted.       Physical Examination:     Well-developed well-nourished patient alert oriented in no acute distress.  Exam total-body performed, including scalp, head, neck, face,nails, hair,  external eyes, including conjunctival mucosa, eyelids, lips external ears, back, chest,/ breasts, axillae,  abdomen, arms, legs, palms.     Multiple light to medium brown, well marginated, uniformly pigmented, macules and papules 6 mm and less scattered on exam. pigmented lesions examined with dermoscopy benign-appearing patterns.     Waxy tannish keratotic papules scattered, cherry-red vascular papules scattered.    See map today's date for lesions noted .  Scattered psoriasiform patches over The arms legs back..    Erythematous scaling keratotic papules right cheek nasal dorsum temples improved crusted patient using Efudex  No recurrence of ScC left-hand    Otherwise remarkable for lesions as noted on map.    Assessment / plan:    No orders of the defined types were placed in this encounter.      Meds & Refills for this Visit:  Requested Prescriptions      No prescriptions requested or ordered in this encounter         Encounter Diagnoses   Name Primary?    AK (actinic keratosis) Yes    Other psoriasis     Seborrheic keratoses     Lentigines     Multiple benign nevi     Cherry angioma     Rosacea     Encounter for follow-up surveillance of melanoma     Encounter for follow-up surveillance of skin cancer          Physical Exam        Results      Patient seen for follow-up long-term monitoring, treatment of  History of actinic keratoses melanoma nonmelanoma skin cancers psoriasis medication monitoring  Plan of care:  ongoing surveillance, monitoring including regular follow-up due to longer term risk of recurrence, new lesions.  See previous notes.  There is a longitudinal care relationship with me, the care plan reflects the ongoing nature of the continuous relationship of care, and the medical record indicates that there is ongoing treatment of a serious/complex medical condition which I am currently managing.  is Applicable      Assessment & Plan  Psoriasis  Psoriasis is well-managed with Floryza, used  twice weekly. Some pink areas are present but not concerning.  - Continue  topical steroids twice weekly.    Bruise  A spot of concern, located at a previous melanoma site, is resolving and identified as a bruise, likely from ruptured capillaries. He reports easy bruising associated with physical activities like moving band equipment.    Neuropathy  Neuropathy symptoms have improved without medication, attributed to dietary changes, specifically reduced sugar intake, which also resulted in weight loss.    Weight loss  He has lost 40-45 pounds over three years through dietary changes, including reduced sugar intake, aimed at losing abdominal weight and improving health. He feels well and attributes the weight loss to healthier eating habits.    Follow-up  A follow-up appointment is scheduled in approximately three months. There was a discussion about potentially moving the appointment to August, but no decision was made.  - Maintain follow-up appointment in three months.    Recording duration: 7 minutes         Lesion of concern to the right collarbone area and left ear  Benign keratoses reassurance.  Stable +area of concern at left posterior calf psoriasiform patch.  Nevus within this has been unchanged.  Stable.    Lesion of concern at left upper abdomen 5 to 6 mm irregular brown macule see photos.  Stable plan recheck in 3 to 4 months.  Continue careful monitoring given history of melanoma, BCC AK's psoriasis.  Lesion stable      Actinic Keratoses.  Precancerous nature discussed. Sun protection, sunscreen/ blocks encouraged .  Monitoring for new lesions.  Sun damage additional recurrent and new actinic keratoses, skin cancers may occur in areas of prior actinic keratoses, related to past sun exposure to minimize current sun exposure.  Sunscreen applied consistently regularly, reapplication and sun protection while driving recommended.  Continue Efudex as needed to areas of scaling on face.  Scaling area at upper  central lip -this has improved -will observe carefully    Multiple medium brown macules 5 to 6 mm uniform and dermoscopy at posterior thighs, calf observed.  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  Jaskaran level II stable doing well no adenopathy no recurrence    At left postauricular neck tannish papule observe.    Actinic keratoses.  Precancerous nature discussed.  Continue careful sun protection.      Cystic papule left neck observe.  Stable  Patient with history of skin cancer.  No evidence of recurrence.        Psoriasis.  No arthritis.  Continue topicals.  Requests refill of ketoconazole for intertriginous lesions.  Continue monitoring.  Continue topicals.  Seems better presently.  Continue careful observation still plaques arms legs.  Scalp improved  Overall stable continue triamcinolone as needed  Triamcinolone working continue    .Rosacea.  Overall stable rhinophyma slow progression meds in grid.  Skin care instructions reviewed.  Pathophysiology reviewed.  Chronic recurrent nature discussed.  Patient will let us know how they are doing over the next several weeks.  Await clinical response to above therapy.  Metronidazole as needed.  Rhinophyma.  Discussed various therapies.  More keratotic papule over the nasal dorsum trial of cryo.  Await response.  Recheck 4 months  Patient currently using compound from [facility] which has been very helpful from DM    Blood pressure increased post COVID doing better continues on metoprolol not taking valsartan or amlodipine more lower blood pressure after his lung surgery    Please refer to map for specific lesions.  See additional diagnoses.  Pros cons of various therapies, risks benefits discussed.Pathophysiology discussed with patient.  Therapeutic options reviewed.  See  Medications in grid.  Instructions reviewed at length.    Benign nevi, seborrheic  keratoses, cherry angiomas:  Reassurance regarding other benign skin  lesions.    General skin care questions answered.   Reassurance regarding benign skin lesions.    Monitor for new or changing lesions. Signs and symptoms of skin cancer, ABCDE's of melanoma ( additional information available at AAD.org, [email or web address].org) Encourage Sunscreen (broad-spectrum, ideally mineral-based-UVA/UVB -SPF 30 or higher) use encouraged, sun protection/sun protective clothing, self exams reviewed Followup as noted RTC ---routine checkup 6 mos -one year or p.r.n.    Encounter Times   Including precharting, reviewing chart, prior notes obtaining history: 10 minutes, medical exam :10 minutes, notes on body map, plan, counseling 10minutes My total time spent caring for the patient on the day of the encounter: 30 minutes     The patient indicates understanding of these issues and agrees to the plan.  The patient is asked to return as noted in follow-up/ above.    This note was generated using Dragon voice recognition software.  Please contact me regarding any confusion resulting from errors in recognition..  Note to patient and family: The 21st Century Cures Act makes medical notes like these available to patients. However, be advised this is a medical document. It is intended as [id number] communication and monitoring of a patient's care needs. It is written in medical language and may contain abbreviations or verbiage that are unfamiliar. It may appear blunt or direct. Medical documents are intended to carry relevant information, facts as evident and the clinical opinion of the practitioner.

## 2025-06-16 ENCOUNTER — OFFICE VISIT (OUTPATIENT)
Dept: DERMATOLOGY CLINIC | Facility: CLINIC | Age: 75
End: 2025-06-16

## 2025-06-16 DIAGNOSIS — L81.4 LENTIGINES: ICD-10-CM

## 2025-06-16 DIAGNOSIS — L82.1 SEBORRHEIC KERATOSES: ICD-10-CM

## 2025-06-16 DIAGNOSIS — L57.0 AK (ACTINIC KERATOSIS): Primary | ICD-10-CM

## 2025-06-16 DIAGNOSIS — L40.8 OTHER PSORIASIS: ICD-10-CM

## 2025-06-16 DIAGNOSIS — L71.9 ROSACEA: ICD-10-CM

## 2025-06-16 DIAGNOSIS — Z85.820 ENCOUNTER FOR FOLLOW-UP SURVEILLANCE OF MELANOMA: ICD-10-CM

## 2025-06-16 DIAGNOSIS — Z08 ENCOUNTER FOR FOLLOW-UP SURVEILLANCE OF SKIN CANCER: ICD-10-CM

## 2025-06-16 DIAGNOSIS — D22.9 MULTIPLE BENIGN NEVI: ICD-10-CM

## 2025-06-16 DIAGNOSIS — Z85.828 ENCOUNTER FOR FOLLOW-UP SURVEILLANCE OF SKIN CANCER: ICD-10-CM

## 2025-06-16 DIAGNOSIS — D23.9 BENIGN NEOPLASM OF SKIN, UNSPECIFIED LOCATION: ICD-10-CM

## 2025-06-16 DIAGNOSIS — D18.01 CHERRY ANGIOMA: ICD-10-CM

## 2025-06-16 DIAGNOSIS — Z08 ENCOUNTER FOR FOLLOW-UP SURVEILLANCE OF MELANOMA: ICD-10-CM

## 2025-06-16 PROCEDURE — G2211 COMPLEX E/M VISIT ADD ON: HCPCS | Performed by: DERMATOLOGY

## 2025-06-16 PROCEDURE — 1159F MED LIST DOCD IN RCRD: CPT | Performed by: DERMATOLOGY

## 2025-06-16 PROCEDURE — 1126F AMNT PAIN NOTED NONE PRSNT: CPT | Performed by: DERMATOLOGY

## 2025-06-16 PROCEDURE — 99213 OFFICE O/P EST LOW 20 MIN: CPT | Performed by: DERMATOLOGY

## 2025-06-16 PROCEDURE — 1160F RVW MEDS BY RX/DR IN RCRD: CPT | Performed by: DERMATOLOGY

## 2025-06-16 NOTE — PROGRESS NOTES
The following individual(s) verbally consented to be recorded using ambient AI listening technology and understand that they can each withdraw their consent to this listening technology at any point by asking the clinician to turn off or pause the recording:    Patient name: Eliceo Leung Sr.  Additional names:  Susana Leung

## 2025-06-22 NOTE — PROGRESS NOTES
Eliceo Leung . is a 75 year old male.  HPI:     CC:    Chief Complaint   Patient presents with    Derm Problem     LOV 2025. Pt present for UBSE with a couple spots of concern on the chest. Pt has hx of  Invasive Melanoma, SCC and AKs         Allergies:  Sulfa antibiotics    HISTORY:    Past Medical History:    High blood pressure    Invasive Melanoma of skin (HCC)    Left lateral elbow    Neuropathy    Psoriasis    Squamous cell skin cancer    L wrist      Past Surgical History:   Procedure Laterality Date    Adenoidectomy      Biopsy      nerve biopsy of foot    Tonsillectomy        History reviewed. No pertinent family history.   Social History     Socioeconomic History    Marital status:    Tobacco Use    Smoking status: Former     Current packs/day: 0.00     Types: Cigarettes     Quit date: 1989     Years since quittin.7     Passive exposure: Never    Smokeless tobacco: Never   Substance and Sexual Activity    Alcohol use: Yes     Comment: Occ    Drug use: Not Currently   Other Topics Concern    Grew up on a farm No    History of tanning Yes    Outdoor occupation No    Pt has a pacemaker No    Pt has a defibrillator No    Reaction to local anesthetic No     Social Drivers of Health     Food Insecurity: Low Risk  (10/15/2024)    Received from [facility]    Food Insecurity     Have there been times that your food ran out, and you didn't have money to get more?: No     Are there times that you worry that this might happen?: No   Transportation Needs: Low Risk  (10/15/2024)    Received from [facility]    Transportation Needs     Do you have trouble getting transportation to medical appointments?: No   Housing Stability: Low Risk  (10/15/2024)    Received from [facility]    Housing Stability     Are you worried that your electric, gas, oil, or water might be shut off?: No     Are you concerned about having a safe and  reliable place to live?: No        Current Outpatient Medications   Medication Sig Dispense Refill    Misc. Devices Does not apply Misc Ivermectin 0.9%, metronidazole 1% azelaic acid 15% niacinamide 5% compound once daily to face #40gm 1 each 5    spironolactone 25 MG Oral Tab Spironolactone Oral        active      fluorouracil 5 % External Cream Use twice weekly at night as directed x 6 weeks 40 g 0    triamcinolone 0.1 % External Cream APPLY TO THE AFFECTED AREA TWICE DAILY AS DIRECTED 454 g 1    atorvastatin 20 MG Oral Tab Take 1 tablet (20 mg total) by mouth daily.      metoprolol succinate ER 50 MG Oral Tablet 24 Hr       Cholecalciferol 25 MCG (1000 UT) Oral Tab Take 2 tablets (2,000 Units total) by mouth daily.      aspirin 81 MG Oral Chew Tab Chew by mouth.      metoprolol succinate ER 25 MG Oral Tablet 24 Hr Take 1 tablet (25 mg total) by mouth daily. (Patient not taking: Reported on 6/16/2025)      valsartan 40 MG Oral Tab Take 1 tablet (40 mg total) by mouth daily. (Patient not taking: Reported on 6/16/2025)      atorvastatin 40 MG Oral Tab Take 1 tablet (40 mg total) by mouth daily. (Patient not taking: Reported on 6/16/2025)      metroNIDAZOLE 0.75 % External Gel Use bid to face (Patient not taking: Reported on 6/16/2025) 180 g 1    AmLODIPine Besylate 5 MG Oral Tab Take 1 tablet (5 mg total) by mouth daily.       Allergies:   Allergies   Allergen Reactions    Sulfa Antibiotics RASH       Past Medical History:    High blood pressure    Invasive Melanoma of skin (HCC)    Left lateral elbow    Neuropathy    Psoriasis    Squamous cell skin cancer    L wrist     Past Surgical History:   Procedure Laterality Date    Adenoidectomy      Biopsy      nerve biopsy of foot    Tonsillectomy       Social History     Socioeconomic History    Marital status:      Spouse name: Not on file    Number of children: Not on file    Years of education: Not on file    Highest education level: Not on file   Occupational  History    Not on file   Tobacco Use    Smoking status: Former     Current packs/day: 0.00     Types: Cigarettes     Quit date: 1989     Years since quittin.7     Passive exposure: Never    Smokeless tobacco: Never   Substance and Sexual Activity    Alcohol use: Yes     Comment: Occ    Drug use: Not Currently    Sexual activity: Not on file   Other Topics Concern    Grew up on a farm No    History of tanning Yes    Outdoor occupation No    Pt has a pacemaker No    Pt has a defibrillator No    Reaction to local anesthetic No   Social History Narrative    Not on file     Social Drivers of Health     Food Insecurity: Low Risk  (10/15/2024)    Received from [facility]    Food Insecurity     Have there been times that your food ran out, and you didn't have money to get more?: No     Are there times that you worry that this might happen?: No   Transportation Needs: Low Risk  (10/15/2024)    Received from [facility]    Transportation Needs     Do you have trouble getting transportation to medical appointments?: No     How do you normally get to and from your appointments?: Not on file   Stress: Not on file   Housing Stability: Low Risk  (10/15/2024)    Received from [facility]    Housing Stability     Are you worried that your electric, gas, oil, or water might be shut off?: No     Are you concerned about having a safe and reliable place to live?: No     History reviewed. No pertinent family history.    There were no vitals filed for this visit.    HPI:    Chief Complaint   Patient presents with    Derm Problem     LOV 2025. Pt present for UBSE with a couple spots of concern on the chest. Pt has hx of  Invasive Melanoma, SCC and AKs       Past notes/ records and appropriate/relevant lab results including pathology and past body maps reviewed. Updated and new information noted in current visit.     History of skin cancers including BCC,  melanoma left lateral elbow 2020 SCC left wrist 2013, multiple AK's rosacea stable  Psoriasis better controlled with topicals     neuropathy controlled on gabapentin no further IVIG.    Post excision melanoma left arm no recurrence 0.3 mm    Patient presents with concerns above.  Right cheek lesion had improved and then return following had used fluorouracil on this occasional flareups in psoriasis. Has  Fluocinonide-- controls psoriasis.  Refill uses this sporadically.  Overalls been doing pretty well    Patient has been in their usual state of health.  History, medications, allergies reviewed as noted.      ROS:  Denies any other systemic complaints.  No new or changeing lesions other than noted above. No fevers, chills, night sweats, unusual sun sensitivity.  No other skin complaints.   A comprehensive 10 point review of systems was completed.  Pertinent positives and negatives noted in the the HPI.       History, medications, allergies reviewed as noted.       Physical Examination:     Well-developed well-nourished patient alert oriented in no acute distress.  Exam total-body performed, including scalp, head, neck, face,nails, hair, external eyes, including conjunctival mucosa, eyelids, lips external ears, back, chest,/ breasts, axillae,  abdomen, arms, legs, palms.     Multiple light to medium brown, well marginated, uniformly pigmented, macules and papules 6 mm and less scattered on exam. pigmented lesions examined with dermoscopy benign-appearing patterns.     Waxy tannish keratotic papules scattered, cherry-red vascular papules scattered.    See map today's date for lesions noted .  Scattered psoriasiform patches over The arms legs back..    Erythematous scaling keratotic papules right cheek nasal dorsum temples improved crusted patient using Efudex  No recurrence of ScC left-hand    Otherwise remarkable for lesions as noted on map.    Assessment / plan:    No orders of the defined types were placed in this  encounter.      Meds & Refills for this Visit:  Requested Prescriptions     Signed Prescriptions Disp Refills    Misc. Devices Does not apply Misc 1 each 5     Sig: Ivermectin 0.9%, metronidazole 1% azelaic acid 15% niacinamide 5% compound once daily to face #40gm         Encounter Diagnoses   Name Primary?    AK (actinic keratosis) Yes    Other psoriasis     Seborrheic keratoses     Lentigines     Multiple benign nevi     Cherry angioma     Encounter for follow-up surveillance of melanoma     Encounter for follow-up surveillance of skin cancer     Benign neoplasm of skin, unspecified location     Rosacea        Lesion of concern at left upper abdomen 5 to 6 mm irregular brown macule see photos.  Plan recheck in 3 to 4 months.  Continue careful monitoring given history of melanoma, BCC AK's psoriasis.    Erythematous scaling keratotic papules noted Actinic Keratoses.  Precancerous nature discussed. Sun protection, sunscreen/ blocks encouraged Lesions treated with cryo- .  Biopsy if not resolved.    x left forehead temple2    Melanoma left elbow 9/2020 0.3 mm superficial spreading.  Jaskaran level II stable doing well no adenopathy no recurrence    Overall AK's fairly stable.  Continue careful sun protection.  Consider repeat Efudex forehead nose ears.  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    At left postauricular neck tannish papule observe.    Actinic keratoses.  Precancerous nature discussed.  Continue careful sun protection.      Cystic papule left neck observe.  Stable  Patient with history of skin cancer.  No evidence of recurrence.        Psoriasis.  No arthritis.  Continue topicals.  Requests refill of ketoconazole for intertriginous lesions.  Continue monitoring.  Continue topicals.  Seems better presently.  Continue careful observation still plaques arms legs.  Scalp improved      .Rosacea.  Overall stable rhinophyma slow progression meds in grid.  Skin care instructions  reviewed.  Pathophysiology reviewed.  Chronic recurrent nature discussed.  Patient will let us know how they are doing over the next several weeks.  Await clinical response to above therapy.  Metronidazole as needed.  Rhinophyma.  Discussed various therapies.  More keratotic papule over the nasal dorsum trial of cryo.  Await response.  Recheck 4 months    Please refer to map for specific lesions.  See additional diagnoses.  Pros cons of various therapies, risks benefits discussed.Pathophysiology discussed with patient.  Therapeutic options reviewed.  See  Medications in grid.  Instructions reviewed at length.    Benign nevi, seborrheic  keratoses, cherry angiomas:  Reassurance regarding other benign skin lesions.Signs and symptoms of skin cancer, ABCDE's of melanoma discussed with patient. Sunscreen use, sun protection, self exams reviewed.  Followup as noted RTC routine checkup 6 mos - one year or p.r.n.    RTC 4-6 months  The patient indicates understanding of these issues and agrees to the plan.  The patient is asked to return as noted in follow-up/ above.    This note was generated using Dragon voice recognition software.  Please contact me regarding any confusion resulting from errors in recognition.    Encounter Times  Including precharting, reviewing chart, prior notes obtaining history: 5 minutes, medical exam :10 minutes, notes on body map, plan, counseling 10minutes My total time spent caring for the patient on the day of the encounter: 25 minutes       Eliceo Leung  is a 75 year old male.  HPI:     CC:    Chief Complaint   Patient presents with    Derm Problem     LOV 03/14/2025. Pt present for UBSE with a couple spots of concern on the chest. Pt has hx of  Invasive Melanoma, SCC and AKs         Allergies:  Sulfa antibiotics    HISTORY:    Past Medical History:    High blood pressure    Invasive Melanoma of skin (HCC)    Left lateral elbow    Neuropathy    Psoriasis    Squamous cell skin cancer     L wrist      Past Surgical History:   Procedure Laterality Date    Adenoidectomy      Biopsy      nerve biopsy of foot    Tonsillectomy        History reviewed. No pertinent family history.   Social History     Socioeconomic History    Marital status:    Tobacco Use    Smoking status: Former     Current packs/day: 0.00     Types: Cigarettes     Quit date: 1989     Years since quittin.7     Passive exposure: Never    Smokeless tobacco: Never   Substance and Sexual Activity    Alcohol use: Yes     Comment: Occ    Drug use: Not Currently   Other Topics Concern    Grew up on a farm No    History of tanning Yes    Outdoor occupation No    Pt has a pacemaker No    Pt has a defibrillator No    Reaction to local anesthetic No     Social Drivers of Health     Food Insecurity: Low Risk  (10/15/2024)    Received from [facility]    Food Insecurity     Have there been times that your food ran out, and you didn't have money to get more?: No     Are there times that you worry that this might happen?: No   Transportation Needs: Low Risk  (10/15/2024)    Received from [facility]    Transportation Needs     Do you have trouble getting transportation to medical appointments?: No   Housing Stability: Low Risk  (10/15/2024)    Received from [facility]    Housing Stability     Are you worried that your electric, gas, oil, or water might be shut off?: No     Are you concerned about having a safe and reliable place to live?: No        Current Outpatient Medications   Medication Sig Dispense Refill    Misc. Devices Does not apply Misc Ivermectin 0.9%, metronidazole 1% azelaic acid 15% niacinamide 5% compound once daily to face #40gm 1 each 5    spironolactone 25 MG Oral Tab Spironolactone Oral        active      fluorouracil 5 % External Cream Use twice weekly at night as directed x 6 weeks 40 g 0    triamcinolone 0.1 % External Cream APPLY TO THE AFFECTED  AREA TWICE DAILY AS DIRECTED 454 g 1    atorvastatin 20 MG Oral Tab Take 1 tablet (20 mg total) by mouth daily.      metoprolol succinate ER 50 MG Oral Tablet 24 Hr       Cholecalciferol 25 MCG (1000 UT) Oral Tab Take 2 tablets (2,000 Units total) by mouth daily.      aspirin 81 MG Oral Chew Tab Chew by mouth.      metoprolol succinate ER 25 MG Oral Tablet 24 Hr Take 1 tablet (25 mg total) by mouth daily. (Patient not taking: Reported on 2025)      valsartan 40 MG Oral Tab Take 1 tablet (40 mg total) by mouth daily. (Patient not taking: Reported on 2025)      atorvastatin 40 MG Oral Tab Take 1 tablet (40 mg total) by mouth daily. (Patient not taking: Reported on 2025)      metroNIDAZOLE 0.75 % External Gel Use bid to face (Patient not taking: Reported on 2025) 180 g 1    AmLODIPine Besylate 5 MG Oral Tab Take 1 tablet (5 mg total) by mouth daily.       Allergies:   Allergies   Allergen Reactions    Sulfa Antibiotics RASH       Past Medical History:    High blood pressure    Invasive Melanoma of skin (HCC)    Left lateral elbow    Neuropathy    Psoriasis    Squamous cell skin cancer    L wrist     Past Surgical History:   Procedure Laterality Date    Adenoidectomy      Biopsy      nerve biopsy of foot    Tonsillectomy       Social History     Socioeconomic History    Marital status:      Spouse name: Not on file    Number of children: Not on file    Years of education: Not on file    Highest education level: Not on file   Occupational History    Not on file   Tobacco Use    Smoking status: Former     Current packs/day: 0.00     Types: Cigarettes     Quit date: 1989     Years since quittin.7     Passive exposure: Never    Smokeless tobacco: Never   Substance and Sexual Activity    Alcohol use: Yes     Comment: Occ    Drug use: Not Currently    Sexual activity: Not on file   Other Topics Concern    Grew up on a farm No    History of tanning Yes    Outdoor occupation No    Pt has a  pacemaker No    Pt has a defibrillator No    Reaction to local anesthetic No   Social History Narrative    Not on file     Social Drivers of Health     Food Insecurity: Low Risk  (10/15/2024)    Received from [facility]    Food Insecurity     Have there been times that your food ran out, and you didn't have money to get more?: No     Are there times that you worry that this might happen?: No   Transportation Needs: Low Risk  (10/15/2024)    Received from [facility]    Transportation Needs     Do you have trouble getting transportation to medical appointments?: No     How do you normally get to and from your appointments?: Not on file   Stress: Not on file   Housing Stability: Low Risk  (10/15/2024)    Received from [facility]    Housing Stability     Are you worried that your electric, gas, oil, or water might be shut off?: No     Are you concerned about having a safe and reliable place to live?: No     History reviewed. No pertinent family history.    There were no vitals filed for this visit.    HPI:    Chief Complaint   Patient presents with    Derm Problem     LOV 03/14/2025. Pt present for UBSE with a couple spots of concern on the chest. Pt has hx of  Invasive Melanoma, SCC and AKs       Past notes/ records and appropriate/relevant lab results including pathology and past body maps reviewed. Updated and new information noted in current visit.     history of lung cancer lobectomy, no additional chemo or radiation was needed.  Doing well.   Lesion of concern to the right collarbone area and left ear    triamcinolone.  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    History of skin cancers including BCC, melanoma left lateral elbow 2020 SCC left wrist 2013, multiple AK's rosacea stable  Psoriasis better -controlled with topicals     neuropathy improved off gabapentin no further IVIG.    Post excision  melanoma left arm no recurrence 0.3 mm      History of Present Illness  3/25  Eliceo Leung Sr. is a 74 year old male with a history of melanoma who presents with concern about a new skin spot.    He noticed a new spot on his skin that appeared similar to a previous melanoma, located in the same area. The spot is now almost gone, having appeared about a week ago. He describes himself as bruising easily and notes that sometimes these spots look very dark initially. He was able to get an appointment quickly due to his concern about the spot.    He has a history of psoriasis, which he manages with topical treatments. He applies fluorouracil twice a week. The psoriasis tends to flare up if not applying triamcinolone consistently  He reports a history of neuropathy, which has improved since he started avoiding sugar. He has lost approximately 40 to 45 pounds over the past three years by cutting out sugar and other dietary changes. The weight loss was not entirely intentional but was aimed at reducing belly fat and improving his overall health. He feels fine and believes his current weight is more appropriate for his bone structure.    6/25  Eliceo Leung Sr. is a 75 year old male with psoriasis who presents for a dermatology follow-up.    He has noticed spots around his upper body and a few cysts on the back of his ear. He is treating a spot on his forehead with a topical medication. His psoriasis is well-controlled, and he is unsure if he needs a refill of his medication, referred to as 'the compound'.    His nose has shown improvement with the medication, with reduced swelling and a smoother texture.    He uses sunscreen but experienced an issue with improper mixing, resulting in a liquid discharge. He has a skin tag that is not bothersome and is not concerned about it being cancerous. He recalls a previous procedure that left barely visible scarring, which he found impressive.    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      Patient presents with concerns above.  Right cheek lesion had improved and then return following had used fluorouracil on this occasional flareups in psoriasis. Has  Fluocinonide-- controls psoriasis.  Refill uses this sporadically.  Overalls been doing pretty well    Patient has been in their usual state of health.  History, medications, allergies reviewed as noted.      ROS:  Denies any other systemic complaints.  No new or changeing lesions other than noted above. No fevers, chills, night sweats, unusual sun sensitivity.  No other skin complaints.   A comprehensive 10 point review of systems was completed.  Pertinent positives and negatives noted in the the HPI.       History, medications, allergies reviewed as noted.       Physical Examination:     Well-developed well-nourished patient alert oriented in no acute distress.  Exam total-body performed, including scalp, head, neck, face,nails, hair, external eyes, including conjunctival mucosa, eyelids, lips external ears, back, chest,/ breasts, axillae,  abdomen, arms, legs, palms.     Multiple light to medium brown, well marginated, uniformly pigmented, macules and papules 6 mm and less scattered on exam. pigmented lesions examined with dermoscopy benign-appearing patterns.     Waxy tannish keratotic papules scattered, cherry-red vascular papules scattered.    See map today's date for lesions noted .  Scattered psoriasiform patches over The arms legs back..    Erythematous scaling keratotic papules right cheek nasal dorsum temples improved crusted patient using Efudex  No recurrence of ScC left-hand    Otherwise remarkable for lesions as noted on map.    Assessment / plan:    No orders of the defined types were placed in this encounter.      Meds & Refills for this Visit:  Requested Prescriptions     Signed Prescriptions Disp  Refills    Misc. Devices Does not apply Misc 1 each 5     Sig: Ivermectin 0.9%, metronidazole 1% azelaic acid 15% niacinamide 5% compound once daily to face #40gm         Encounter Diagnoses   Name Primary?    AK (actinic keratosis) Yes    Other psoriasis     Seborrheic keratoses     Lentigines     Multiple benign nevi     Cherry angioma     Encounter for follow-up surveillance of melanoma     Encounter for follow-up surveillance of skin cancer     Benign neoplasm of skin, unspecified location     Rosacea          Physical Exam  HEENT: Cysts on the back of the ear.  SKIN: Skin tag present, non-cancerous.    Results    Patient seen for follow-up long-term monitoring, treatment of  History of actinic keratoses melanoma nonmelanoma skin cancers psoriasis medication monitoring  Plan of care:  ongoing surveillance, monitoring including regular follow-up due to longer term risk of recurrence, new lesions.  See previous notes.  There is a longitudinal care relationship with me, the care plan reflects the ongoing nature of the continuous relationship of care, and the medical record indicates that there is ongoing treatment of a serious/complex medical condition which I am currently managing.  is Applicable      Assessment & Plan    3/25  Psoriasis  Psoriasis is well-managed with Floryza, used twice weekly. Some pink areas are present but not concerning.  - Continue  topical steroids twice weekly.    Bruise  A spot of concern, located at a previous melanoma site, is resolving and identified as a bruise, likely from ruptured capillaries. He reports easy bruising associated with physical activities like moving band equipment.    Neuropathy  Neuropathy symptoms have improved without medication, attributed to dietary changes, specifically reduced sugar intake, which also resulted in weight loss.    Weight loss  He has lost 40-45 pounds over three years through dietary changes, including reduced sugar intake, aimed at losing  abdominal weight and improving health. He feels well and attributes the weight loss to healthier eating habits.    Follow-up  A follow-up appointment is scheduled in approximately three months. There was a discussion about potentially moving the appointment to August, but no decision was made.  - Maintain follow-up appointment in three months.    Recording duration: 7 minutes    6/25  Rosacea  Rosacea is well-managed with the current treatment regimen, resulting in reduced bulbous appearance of the nose and smoother skin texture. He is satisfied with the treatment and wishes to continue.  - Send a new prescription for the compound medication.  Compound with ivermectin and metronidazole azelaic acid niacinamide from [facility] working well       psoriasis  Psoriasis is well-controlled with no indication for a change in management.        Reassurance regarding lesion of concern on chest had peeled away slightly erythematous likely inflamed SK    More active AK at left temple will use fluorouracil in this area     Lesion of concern to the right collarbone area and left ear  Benign keratoses reassurance.  Stable +area of concern at left posterior calf psoriasiform patch.  Nevus within this has been unchanged.  Stable.    Lesion of concern at left upper abdomen 5 to 6 mm irregular brown macule see photos.  Stable plan recheck in 3 to 4 months.  Continue careful monitoring given history of melanoma, BCC AK's psoriasis.  Lesion stable      Actinic Keratoses.  Precancerous nature discussed. Sun protection, sunscreen/ blocks encouraged .  Monitoring for new lesions.  Sun damage additional recurrent and new actinic keratoses, skin cancers may occur in areas of prior actinic keratoses, related to past sun exposure to minimize current sun exposure.  Sunscreen applied consistently regularly, reapplication and sun protection while driving recommended.  Continue Efudex as needed to areas of scaling on face.  Scaling area  at upper central lip -this has improved -will observe carefully    Multiple medium brown macules 5 to 6 mm uniform and dermoscopy at posterior thighs, calf observed.  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  Jaskaran level II stable doing well no adenopathy no recurrence    At left postauricular neck tannish papule observe.    Actinic keratoses.  Precancerous nature discussed.  Continue careful sun protection.      Cystic papule left neck observe.  Stable  Patient with history of skin cancer.  No evidence of recurrence.        Psoriasis.  No arthritis.  Continue topicals.  Requests refill of ketoconazole for intertriginous lesions.  Continue monitoring.  Continue topicals.  Seems better presently.  Continue careful observation still plaques arms legs.  Scalp improved  Overall stable continue triamcinolone as needed  Triamcinolone working continue    .Rosacea.  Overall stable rhinophyma slow progression meds in grid.  Skin care instructions reviewed.  Pathophysiology reviewed.  Chronic recurrent nature discussed.  Patient will let us know how they are doing over the next several weeks.  Await clinical response to above therapy.  Metronidazole as needed.  Rhinophyma.  Discussed various therapies.  More keratotic papule over the nasal dorsum trial of cryo.  Await response.  Recheck 4 months  Patient currently using compound from [facility] [employer] [facility] which has been very helpful from [facility]    Blood pressure increased post COVID doing better continues on metoprolol not taking valsartan or amlodipine more lower blood pressure after his lung surgery    Please refer to map for specific lesions.  See additional diagnoses.  Pros cons of various therapies, risks benefits discussed.Pathophysiology discussed with patient.  Therapeutic options reviewed.  See  Medications in grid.  Instructions reviewed at length.    Benign nevi, seborrheic  keratoses, cherry angiomas:  Reassurance regarding other benign skin  lesions.    General skin care questions answered.   Reassurance regarding benign skin lesions.    Monitor for new or changing lesions. Signs and symptoms of skin cancer, ABCDE's of melanoma ( additional information available at AAD.org, skincancer.org) Encourage Sunscreen (broad-spectrum, ideally mineral-based-UVA/UVB -SPF 30 or higher) use encouraged, sun protection/sun protective clothing, self exams reviewed Followup as noted RTC ---routine checkup 6 mos -one year or p.r.n.    Encounter Times   Including precharting, reviewing chart, prior notes obtaining history: 10 minutes, medical exam :10 minutes, notes on body map, plan, counseling 10minutes My total time spent caring for the patient on the day of the encounter: 30 minutes     The patient indicates understanding of these issues and agrees to the plan.  The patient is asked to return as noted in follow-up/ above.    This note was generated using Dragon voice recognition software.  Please contact me regarding any confusion resulting from errors in recognition..  Note to patient and family: The 21st Century Cures Act makes medical notes like these available to patients. However, be advised this is a medical document. It is intended as [id number] communication and monitoring of a patient's care needs. It is written in medical language and may contain abbreviations or verbiage that are unfamiliar. It may appear blunt or direct. Medical documents are intended to carry relevant information, facts as evident and the clinical opinion of the practitioner.

## (undated) DEVICE — DRAPE,EXTREMITY,89X128,STERILE: Brand: MEDLINE

## (undated) DEVICE — IMPERVIOUS STOCKINETTE: Brand: DEROYAL

## (undated) DEVICE — SUTURE ETHILON 3-0 PS-2

## (undated) DEVICE — PROXIMATE RH ROTATING HEAD SKIN STAPLERS (35 WIDE) CONTAINS 35 STAINLESS STEEL STAPLES: Brand: PROXIMATE

## (undated) DEVICE — SUTURE ETHIBOND EXCEL 2-0 SH

## (undated) DEVICE — SUTURE VICRYL 4-0 J494G

## (undated) DEVICE — TOWEL OR BLU 16X26 STRL

## (undated) DEVICE — DRAPE SHEET LG

## (undated) DEVICE — MINOR GENERAL: Brand: MEDLINE INDUSTRIES, INC.

## (undated) DEVICE — GAMMEX® PI HYBRID SIZE 6, STERILE POWDER-FREE SURGICAL GLOVE, POLYISOPRENE AND NEOPRENE BLEND: Brand: GAMMEX

## (undated) DEVICE — SUTURE PDS II 3-0 SH

## (undated) DEVICE — PEN: MARKING STD PT 100/CS: Brand: MEDICAL ACTION INDUSTRIES

## (undated) DEVICE — NON-ADHERENT PAD PREPACK: Brand: TELFA

## (undated) DEVICE — SUTURE VICRYL 3-0 SH

## (undated) DEVICE — SUTURE SILK 2-0 SH

## (undated) DEVICE — SUTURE PROLENE 2-0 SH

## (undated) DEVICE — SOL  .9 1000ML BTL

## (undated) DEVICE — GAMMEX® PI HYBRID SIZE 7, STERILE POWDER-FREE SURGICAL GLOVE, POLYISOPRENE AND NEOPRENE BLEND: Brand: GAMMEX

## (undated) NOTE — LETTER
3/17/2018              [address] [facility] [address], [address]         Dear Morena Quinones,      The report of the Biopsy done on 3-14-18 showed an Seborrheic Keratosis.   This is a benign (not cancerous) growth, and req

## (undated) NOTE — MR AVS SNAPSHOT
[facility]  [address] [place] [address], [address] [place] [address]               Thank you for choosing us for your health care visit with Aida Diaz MD.  We are glad to serve you and happy to provide you with this summary of Enter your [employer] Activation Code exactly as it appears below along with your Zip Code and Date of Birth to complete the sign-up process. If you do not sign up before the expiration date, you must request a new code.     Your unique [employer] Access Code: Via [employer] Visit [facility] online at  [email or web address]